# Patient Record
Sex: FEMALE | Race: WHITE | Employment: FULL TIME | URBAN - METROPOLITAN AREA
[De-identification: names, ages, dates, MRNs, and addresses within clinical notes are randomized per-mention and may not be internally consistent; named-entity substitution may affect disease eponyms.]

---

## 2020-02-05 ENCOUNTER — HOSPITAL ENCOUNTER (EMERGENCY)
Age: 45
Discharge: HOME | End: 2020-02-05
Attending: EMERGENCY MEDICINE
Payer: COMMERCIAL

## 2020-02-05 ENCOUNTER — APPOINTMENT (OUTPATIENT)
Dept: CT IMAGING | Age: 45
End: 2020-02-05
Attending: EMERGENCY MEDICINE
Payer: COMMERCIAL

## 2020-02-05 VITALS
RESPIRATION RATE: 17 BRPM | TEMPERATURE: 98.5 F | OXYGEN SATURATION: 100 % | SYSTOLIC BLOOD PRESSURE: 118 MMHG | DIASTOLIC BLOOD PRESSURE: 79 MMHG | WEIGHT: 148.15 LBS | BODY MASS INDEX: 23.2 KG/M2 | HEART RATE: 99 BPM

## 2020-02-05 DIAGNOSIS — N12 PYELONEPHRITIS: Primary | ICD-10-CM

## 2020-02-05 LAB
ALBUMIN SERPL-MCNC: 3.3 G/DL (ref 3.5–5)
ALBUMIN/GLOB SERPL: 0.8 {RATIO} (ref 1.1–2.2)
ALP SERPL-CCNC: 74 U/L (ref 45–117)
ALT SERPL-CCNC: 23 U/L (ref 12–78)
ANION GAP SERPL CALC-SCNC: 13 MMOL/L (ref 5–15)
APPEARANCE UR: ABNORMAL
AST SERPL-CCNC: 14 U/L (ref 15–37)
BACTERIA URNS QL MICRO: ABNORMAL /HPF
BASOPHILS # BLD: 0 K/UL (ref 0–0.1)
BASOPHILS NFR BLD: 0 % (ref 0–1)
BILIRUB SERPL-MCNC: 0.6 MG/DL (ref 0.2–1)
BILIRUB UR QL: NEGATIVE
BUN SERPL-MCNC: 9 MG/DL (ref 6–20)
BUN/CREAT SERPL: 12 (ref 12–20)
CALCIUM SERPL-MCNC: 8.6 MG/DL (ref 8.5–10.1)
CHLORIDE SERPL-SCNC: 97 MMOL/L (ref 97–108)
CO2 SERPL-SCNC: 24 MMOL/L (ref 21–32)
COLOR UR: ABNORMAL
COMMENT, HOLDF: NORMAL
CREAT SERPL-MCNC: 0.73 MG/DL (ref 0.55–1.02)
DIFFERENTIAL METHOD BLD: ABNORMAL
EOSINOPHIL # BLD: 0 K/UL (ref 0–0.4)
EOSINOPHIL NFR BLD: 0 % (ref 0–7)
EPITH CASTS URNS QL MICRO: ABNORMAL /LPF
ERYTHROCYTE [DISTWIDTH] IN BLOOD BY AUTOMATED COUNT: 11.4 % (ref 11.5–14.5)
GLOBULIN SER CALC-MCNC: 4.2 G/DL (ref 2–4)
GLUCOSE SERPL-MCNC: 122 MG/DL (ref 65–100)
GLUCOSE UR STRIP.AUTO-MCNC: NEGATIVE MG/DL
HCT VFR BLD AUTO: 36.9 % (ref 35–47)
HGB BLD-MCNC: 12.5 G/DL (ref 11.5–16)
HGB UR QL STRIP: ABNORMAL
IMM GRANULOCYTES # BLD AUTO: 0.1 K/UL (ref 0–0.04)
IMM GRANULOCYTES NFR BLD AUTO: 1 % (ref 0–0.5)
KETONES UR QL STRIP.AUTO: NEGATIVE MG/DL
LACTATE SERPL-SCNC: 0.9 MMOL/L (ref 0.4–2)
LEUKOCYTE ESTERASE UR QL STRIP.AUTO: ABNORMAL
LYMPHOCYTES # BLD: 1.3 K/UL (ref 0.8–3.5)
LYMPHOCYTES NFR BLD: 13 % (ref 12–49)
MAGNESIUM SERPL-MCNC: 1.9 MG/DL (ref 1.6–2.4)
MCH RBC QN AUTO: 32.5 PG (ref 26–34)
MCHC RBC AUTO-ENTMCNC: 33.9 G/DL (ref 30–36.5)
MCV RBC AUTO: 95.8 FL (ref 80–99)
MONOCYTES # BLD: 0.6 K/UL (ref 0–1)
MONOCYTES NFR BLD: 6 % (ref 5–13)
NEUTS SEG # BLD: 8.4 K/UL (ref 1.8–8)
NEUTS SEG NFR BLD: 80 % (ref 32–75)
NITRITE UR QL STRIP.AUTO: NEGATIVE
NRBC # BLD: 0 K/UL (ref 0–0.01)
NRBC BLD-RTO: 0 PER 100 WBC
PH UR STRIP: 7 [PH] (ref 5–8)
PLATELET # BLD AUTO: 196 K/UL (ref 150–400)
PMV BLD AUTO: 9.1 FL (ref 8.9–12.9)
POTASSIUM SERPL-SCNC: 3.4 MMOL/L (ref 3.5–5.1)
PROT SERPL-MCNC: 7.5 G/DL (ref 6.4–8.2)
PROT UR STRIP-MCNC: NEGATIVE MG/DL
RBC # BLD AUTO: 3.85 M/UL (ref 3.8–5.2)
RBC #/AREA URNS HPF: ABNORMAL /HPF (ref 0–5)
SAMPLES BEING HELD,HOLD: NORMAL
SODIUM SERPL-SCNC: 134 MMOL/L (ref 136–145)
SP GR UR REFRACTOMETRY: 1 (ref 1–1.03)
UROBILINOGEN UR QL STRIP.AUTO: 0.2 EU/DL (ref 0.2–1)
WBC # BLD AUTO: 10.4 K/UL (ref 3.6–11)
WBC URNS QL MICRO: ABNORMAL /HPF (ref 0–4)

## 2020-02-05 PROCEDURE — 80053 COMPREHEN METABOLIC PANEL: CPT

## 2020-02-05 PROCEDURE — 74011250636 HC RX REV CODE- 250/636: Performed by: EMERGENCY MEDICINE

## 2020-02-05 PROCEDURE — 85025 COMPLETE CBC W/AUTO DIFF WBC: CPT

## 2020-02-05 PROCEDURE — 74176 CT ABD & PELVIS W/O CONTRAST: CPT

## 2020-02-05 PROCEDURE — 87077 CULTURE AEROBIC IDENTIFY: CPT

## 2020-02-05 PROCEDURE — 96365 THER/PROPH/DIAG IV INF INIT: CPT

## 2020-02-05 PROCEDURE — 99283 EMERGENCY DEPT VISIT LOW MDM: CPT

## 2020-02-05 PROCEDURE — 87086 URINE CULTURE/COLONY COUNT: CPT

## 2020-02-05 PROCEDURE — 87186 SC STD MICRODIL/AGAR DIL: CPT

## 2020-02-05 PROCEDURE — 87040 BLOOD CULTURE FOR BACTERIA: CPT

## 2020-02-05 PROCEDURE — 83735 ASSAY OF MAGNESIUM: CPT

## 2020-02-05 PROCEDURE — 96375 TX/PRO/DX INJ NEW DRUG ADDON: CPT

## 2020-02-05 PROCEDURE — 81001 URINALYSIS AUTO W/SCOPE: CPT

## 2020-02-05 PROCEDURE — 83605 ASSAY OF LACTIC ACID: CPT

## 2020-02-05 PROCEDURE — 36415 COLL VENOUS BLD VENIPUNCTURE: CPT

## 2020-02-05 PROCEDURE — 74011000258 HC RX REV CODE- 258: Performed by: EMERGENCY MEDICINE

## 2020-02-05 RX ORDER — CIPROFLOXACIN 500 MG/1
500 TABLET ORAL 2 TIMES DAILY
Qty: 14 TAB | Refills: 0 | Status: ON HOLD | OUTPATIENT
Start: 2020-02-05 | End: 2020-02-08 | Stop reason: SDUPTHER

## 2020-02-05 RX ORDER — KETOROLAC TROMETHAMINE 30 MG/ML
30 INJECTION, SOLUTION INTRAMUSCULAR; INTRAVENOUS
Status: COMPLETED | OUTPATIENT
Start: 2020-02-05 | End: 2020-02-05

## 2020-02-05 RX ORDER — KETOROLAC TROMETHAMINE 10 MG/1
10 TABLET, FILM COATED ORAL
Qty: 20 TAB | Refills: 0 | Status: SHIPPED | OUTPATIENT
Start: 2020-02-05 | End: 2022-08-02

## 2020-02-05 RX ADMIN — SODIUM CHLORIDE 1000 ML: 900 INJECTION, SOLUTION INTRAVENOUS at 11:14

## 2020-02-05 RX ADMIN — CEFTRIAXONE 1 G: 1 INJECTION, POWDER, FOR SOLUTION INTRAMUSCULAR; INTRAVENOUS at 11:20

## 2020-02-05 RX ADMIN — KETOROLAC TROMETHAMINE 30 MG: 30 INJECTION, SOLUTION INTRAMUSCULAR at 11:14

## 2020-02-05 NOTE — ED TRIAGE NOTES
TRIAGE NOTE: Arrives for uti symptoms and left flank pain x 2 weeks. Today pain is more consistent and desc as a dull ache. Seen at workplace clinic, they were concerned for pylonephritis. Arrives with results and vitals from clinic.

## 2020-02-05 NOTE — DISCHARGE INSTRUCTIONS
Patient Education        Kidney Infection: Care Instructions  Your Care Instructions    A kidney infection (pyelonephritis) is a type of urinary tract infection, or UTI. Most UTIs are bladder infections. Kidney infections tend to make people much sicker than bladder infections do. A kidney infection is also more serious because it can cause lasting damage if it is not treated quickly. Follow-up care is a key part of your treatment and safety. Be sure to make and go to all appointments, and call your doctor if you are having problems. It's also a good idea to know your test results and keep a list of the medicines you take. How can you care for yourself at home? · Take your antibiotics as directed. Do not stop taking them just because you feel better. You need to take the full course of antibiotics. · Drink plenty of water, enough so that your urine is light yellow or clear like water. This may help wash out bacteria that are causing the infection. If you have kidney, heart, or liver disease and have to limit fluids, talk with your doctor before you increase the amount of fluids you drink. · Urinate often. Try to empty your bladder each time. · To relieve pain, take a hot shower or lay a heating pad (set on low) over your lower belly. Never go to sleep with a heating pad in place. Put a thin cloth between the heating pad and your skin. To help prevent kidney infections  · Drink plenty of water each day. This helps you urinate often, which clears bacteria from your system. If you have kidney, heart, or liver disease and have to limit fluids, talk with your doctor before you increase the amount of fluids you drink. · Urinate when you have the urge. Do not hold your urine for a long time. Urinate before you go to sleep. · If you have symptoms of a bladder infection, such as burning when you urinate or having to urinate often, call your doctor so you can treat the problem before it gets worse.  If you do not treat a bladder infection quickly, it can spread to the kidney. · Men should keep the tip of the penis clean. If you are a woman, keep these ideas in mind:  · Urinate right after you have sex. · Change sanitary pads often. Avoid douches, feminine hygiene sprays, and other feminine hygiene products that have deodorants. · After going to the bathroom, wipe from front to back. When should you call for help? Call your doctor now or seek immediate medical care if:    · You have symptoms that a kidney infection is getting worse. These may include:  ? Pain or burning when you urinate. ? A frequent need to urinate without being able to pass much urine. ? Pain in the flank, which is just below the rib cage and above the waist on either side of the back. ? Blood in the urine. ? A fever.     · You are vomiting or nauseated.    Watch closely for changes in your health, and be sure to contact your doctor if:    · You do not get better as expected. Where can you learn more? Go to http://rosy-joselo.info/. Enter R379 in the search box to learn more about \"Kidney Infection: Care Instructions. \"  Current as of: October 31, 2018  Content Version: 12.2  © 4966-8587 [employer]. Care instructions adapted under license by [employer] (which disclaims liability or warranty for this information). If you have questions about a medical condition or this instruction, always ask your healthcare professional. [place] [address] any warranty or liability for your use of this information.

## 2020-02-05 NOTE — ED PROVIDER NOTES
HPI     Pt is a 45 y.o. F with no PMH here with c/o left flank pain x 1 week. She says pain started as sharp and now has become a dull constant ache. She denies injury. No radiation of pain. No N/V. No hx of kidney stone. She does have hx of UTI and 2 weeks ago had dysuria but she said this subsided spontaneously without treatment. She went to clinic at work today and was found to have fever and be tachycardic with infected urine and thus was sent to ED for concern for pyelonephritis. No other complaints at this time. She was given tylenol at the clinic just PTA. History reviewed. No pertinent past medical history.     Past Surgical History:   Procedure Laterality Date    HX BREAST AUGMENTATION Bilateral 10/9/2014    BILATERAL BREAST AUGMENTATION AND MASTOPEXY, TUMMY TUCK WITH LIPOSUCTION performed by Robb Gibson MD at [facility] MAIN OR    HX OTHER SURGICAL      EXC CYST LEFT LEG         Family History:   Problem Relation Age of Onset    Thyroid Disease Mother     Psychiatric Disorder Mother     Lung Disease Father         LUNG    Cancer Father         PROSTATE    Anesth Problems Neg Hx        Social History     Socioeconomic History    Marital status:      Spouse name: Not on file    Number of children: Not on file    Years of education: Not on file    Highest education level: Not on file   Occupational History    Not on file   Social Needs    Financial resource strain: Not on file    Food insecurity:     Worry: Not on file     Inability: Not on file    Transportation needs:     Medical: Not on file     Non-medical: Not on file   Tobacco Use    Smoking status: Former Smoker     Last attempt to quit: 10/3/2012     Years since quittin.3    Smokeless tobacco: Never Used   Substance and Sexual Activity    Alcohol use: Yes     Comment: 2-3 DRINKS PER WEEK    Drug use: No    Sexual activity: Not on file   Lifestyle    Physical activity:     Days per week: Not on file Minutes per session: Not on file    Stress: Not on file   Relationships    Social connections:     Talks on phone: Not on file     Gets together: Not on file     Attends Confucianist service: Not on file     Active member of club or organization: Not on file     Attends meetings of clubs or organizations: Not on file     Relationship status: Not on file    Intimate partner violence:     Fear of current or ex partner: Not on file     Emotionally abused: Not on file     Physically abused: Not on file     Forced sexual activity: Not on file   Other Topics Concern    Not on file   Social History Narrative    Not on file         ALLERGIES: Patient has no known allergies. Review of Systems   Constitutional: Positive for fever. Negative for chills and diaphoresis. HENT: Negative for congestion and trouble swallowing. Eyes: Negative for photophobia and visual disturbance. Respiratory: Negative for cough, chest tightness and shortness of breath. Cardiovascular: Negative for chest pain, palpitations and leg swelling. Gastrointestinal: Negative for abdominal pain, diarrhea, nausea and vomiting. Genitourinary: Positive for flank pain. Negative for difficulty urinating, dysuria and frequency. Musculoskeletal: Negative for back pain and myalgias. Skin: Negative for rash and wound. Neurological: Negative for dizziness, weakness, light-headedness and headaches. Hematological: Negative for adenopathy. Does not bruise/bleed easily. Psychiatric/Behavioral: Negative for agitation and confusion. All other systems reviewed and are negative. Vitals:    02/05/20 1018   BP: (P) 143/90   Pulse: (!) (P) 115   Resp: (P) 18   Temp: (P) 99.3 °F (37.4 °C)   SpO2: (P) 99%   Weight: 67.2 kg (148 lb 2.4 oz)            Physical Exam  Vitals signs and nursing note reviewed. Constitutional:       General: She is not in acute distress. Appearance: She is well-developed. She is not diaphoretic.    HENT:      Head: Normocephalic. Eyes:      Conjunctiva/sclera: Conjunctivae normal.      Pupils: Pupils are equal, round, and reactive to light. Neck:      Musculoskeletal: Normal range of motion and neck supple. Vascular: No JVD. Cardiovascular:      Rate and Rhythm: Regular rhythm. Tachycardia present. Heart sounds: Normal heart sounds. Pulmonary:      Effort: Pulmonary effort is normal.      Breath sounds: Normal breath sounds. Abdominal:      General: Bowel sounds are normal. There is no distension. Palpations: Abdomen is soft. Tenderness: There is no abdominal tenderness. There is left CVA tenderness. Musculoskeletal: Normal range of motion. General: No tenderness or deformity. Lymphadenopathy:      Cervical: No cervical adenopathy. Skin:     General: Skin is warm and dry. Capillary Refill: Capillary refill takes less than 2 seconds. Findings: No erythema or rash. Neurological:      Mental Status: She is alert and oriented to person, place, and time. Cranial Nerves: No cranial nerve deficit. Sensory: No sensory deficit. MDM       Procedures     Pt with pyelo. No signs of sepsis. Tolerating PO. Renal fxn normal.  No underlying PMH thus will tx outpatient. Rocephin IV given in ED.  cirpo Rx and Cx sent/pending. 12:09 PM  Patient's results have been reviewed with them. Patient and/or family have verbally conveyed their understanding and agreement of the patient's signs, symptoms, diagnosis, treatment and prognosis and additionally agree to follow up as recommended or return to the Emergency Room should their condition change prior to follow-up. Discharge instructions have also been provided to the patient with some educational information regarding their diagnosis as well a list of reasons why they would want to return to the ER prior to their follow-up appointment should their condition change.     Leopold Donna, MD

## 2020-02-06 ENCOUNTER — HOSPITAL ENCOUNTER (INPATIENT)
Age: 45
LOS: 2 days | Discharge: HOME | DRG: 690 | End: 2020-02-08
Attending: EMERGENCY MEDICINE | Admitting: FAMILY MEDICINE
Payer: COMMERCIAL

## 2020-02-06 DIAGNOSIS — N12 PYELONEPHRITIS: ICD-10-CM

## 2020-02-06 DIAGNOSIS — R78.81 BACTEREMIA: Primary | ICD-10-CM

## 2020-02-06 LAB
ALBUMIN SERPL-MCNC: 2.9 G/DL (ref 3.5–5)
ALBUMIN/GLOB SERPL: 0.7 {RATIO} (ref 1.1–2.2)
ALP SERPL-CCNC: 60 U/L (ref 45–117)
ALT SERPL-CCNC: 18 U/L (ref 12–78)
ANION GAP SERPL CALC-SCNC: 5 MMOL/L (ref 5–15)
AST SERPL-CCNC: 14 U/L (ref 15–37)
BASOPHILS # BLD: 0 K/UL (ref 0–0.1)
BASOPHILS NFR BLD: 0 % (ref 0–1)
BILIRUB SERPL-MCNC: 0.3 MG/DL (ref 0.2–1)
BUN SERPL-MCNC: 6 MG/DL (ref 6–20)
BUN/CREAT SERPL: 9 (ref 12–20)
CALCIUM SERPL-MCNC: 8.7 MG/DL (ref 8.5–10.1)
CHLORIDE SERPL-SCNC: 107 MMOL/L (ref 97–108)
CO2 SERPL-SCNC: 25 MMOL/L (ref 21–32)
CREAT SERPL-MCNC: 0.66 MG/DL (ref 0.55–1.02)
DIFFERENTIAL METHOD BLD: ABNORMAL
EOSINOPHIL # BLD: 0.1 K/UL (ref 0–0.4)
EOSINOPHIL NFR BLD: 1 % (ref 0–7)
ERYTHROCYTE [DISTWIDTH] IN BLOOD BY AUTOMATED COUNT: 11.4 % (ref 11.5–14.5)
GLOBULIN SER CALC-MCNC: 4.4 G/DL (ref 2–4)
GLUCOSE SERPL-MCNC: 94 MG/DL (ref 65–100)
HCT VFR BLD AUTO: 38.2 % (ref 35–47)
HGB BLD-MCNC: 12.7 G/DL (ref 11.5–16)
IMM GRANULOCYTES # BLD AUTO: 0.1 K/UL (ref 0–0.04)
IMM GRANULOCYTES NFR BLD AUTO: 1 % (ref 0–0.5)
LACTATE SERPL-SCNC: 1 MMOL/L (ref 0.4–2)
LYMPHOCYTES # BLD: 2.4 K/UL (ref 0.8–3.5)
LYMPHOCYTES NFR BLD: 25 % (ref 12–49)
MCH RBC QN AUTO: 33.2 PG (ref 26–34)
MCHC RBC AUTO-ENTMCNC: 33.2 G/DL (ref 30–36.5)
MCV RBC AUTO: 99.7 FL (ref 80–99)
MONOCYTES # BLD: 1 K/UL (ref 0–1)
MONOCYTES NFR BLD: 11 % (ref 5–13)
NEUTS SEG # BLD: 5.9 K/UL (ref 1.8–8)
NEUTS SEG NFR BLD: 62 % (ref 32–75)
NRBC # BLD: 0 K/UL (ref 0–0.01)
NRBC BLD-RTO: 0 PER 100 WBC
PLATELET # BLD AUTO: 174 K/UL (ref 150–400)
PMV BLD AUTO: 9.5 FL (ref 8.9–12.9)
POTASSIUM SERPL-SCNC: 4.1 MMOL/L (ref 3.5–5.1)
PROT SERPL-MCNC: 7.3 G/DL (ref 6.4–8.2)
RBC # BLD AUTO: 3.83 M/UL (ref 3.8–5.2)
SODIUM SERPL-SCNC: 137 MMOL/L (ref 136–145)
WBC # BLD AUTO: 9.4 K/UL (ref 3.6–11)

## 2020-02-06 PROCEDURE — 36415 COLL VENOUS BLD VENIPUNCTURE: CPT

## 2020-02-06 PROCEDURE — 96365 THER/PROPH/DIAG IV INF INIT: CPT

## 2020-02-06 PROCEDURE — 74011000258 HC RX REV CODE- 258: Performed by: INTERNAL MEDICINE

## 2020-02-06 PROCEDURE — 65660000000 HC RM CCU STEPDOWN

## 2020-02-06 PROCEDURE — 74011250636 HC RX REV CODE- 250/636: Performed by: FAMILY MEDICINE

## 2020-02-06 PROCEDURE — 74011250636 HC RX REV CODE- 250/636: Performed by: EMERGENCY MEDICINE

## 2020-02-06 PROCEDURE — 74011000258 HC RX REV CODE- 258: Performed by: EMERGENCY MEDICINE

## 2020-02-06 PROCEDURE — 99285 EMERGENCY DEPT VISIT HI MDM: CPT

## 2020-02-06 PROCEDURE — 87086 URINE CULTURE/COLONY COUNT: CPT

## 2020-02-06 PROCEDURE — 83605 ASSAY OF LACTIC ACID: CPT

## 2020-02-06 PROCEDURE — 85025 COMPLETE CBC W/AUTO DIFF WBC: CPT

## 2020-02-06 PROCEDURE — 80053 COMPREHEN METABOLIC PANEL: CPT

## 2020-02-06 PROCEDURE — 74011250636 HC RX REV CODE- 250/636: Performed by: INTERNAL MEDICINE

## 2020-02-06 PROCEDURE — 87040 BLOOD CULTURE FOR BACTERIA: CPT

## 2020-02-06 RX ORDER — ENOXAPARIN SODIUM 100 MG/ML
40 INJECTION SUBCUTANEOUS EVERY 24 HOURS
Status: DISCONTINUED | OUTPATIENT
Start: 2020-02-06 | End: 2020-02-07

## 2020-02-06 RX ORDER — SODIUM CHLORIDE 0.9 % (FLUSH) 0.9 %
5-40 SYRINGE (ML) INJECTION EVERY 8 HOURS
Status: DISCONTINUED | OUTPATIENT
Start: 2020-02-06 | End: 2020-02-08 | Stop reason: HOSPADM

## 2020-02-06 RX ORDER — KETOROLAC TROMETHAMINE 30 MG/ML
30 INJECTION, SOLUTION INTRAMUSCULAR; INTRAVENOUS
Status: DISCONTINUED | OUTPATIENT
Start: 2020-02-06 | End: 2020-02-08

## 2020-02-06 RX ORDER — SODIUM CHLORIDE 0.9 % (FLUSH) 0.9 %
5-40 SYRINGE (ML) INJECTION AS NEEDED
Status: DISCONTINUED | OUTPATIENT
Start: 2020-02-06 | End: 2020-02-08 | Stop reason: HOSPADM

## 2020-02-06 RX ADMIN — CEFEPIME HYDROCHLORIDE 2 G: 2 INJECTION, POWDER, FOR SOLUTION INTRAVENOUS at 22:43

## 2020-02-06 RX ADMIN — SODIUM CHLORIDE 1000 ML: 900 INJECTION, SOLUTION INTRAVENOUS at 09:52

## 2020-02-06 RX ADMIN — CEFEPIME HYDROCHLORIDE 2 G: 2 INJECTION, POWDER, FOR SOLUTION INTRAVENOUS at 17:16

## 2020-02-06 RX ADMIN — CEFTRIAXONE 1 G: 1 INJECTION, POWDER, FOR SOLUTION INTRAMUSCULAR; INTRAVENOUS at 11:35

## 2020-02-06 RX ADMIN — SODIUM CHLORIDE 1000 ML: 900 INJECTION, SOLUTION INTRAVENOUS at 11:35

## 2020-02-06 RX ADMIN — ENOXAPARIN SODIUM 40 MG: 40 INJECTION SUBCUTANEOUS at 17:20

## 2020-02-06 RX ADMIN — KETOROLAC TROMETHAMINE 30 MG: 30 INJECTION, SOLUTION INTRAMUSCULAR at 13:57

## 2020-02-06 RX ADMIN — Medication 10 ML: at 22:44

## 2020-02-06 NOTE — PROGRESS NOTES
02/06/20 1348   Vital Signs   Temp 100.2 °F (37.9 °C)   Temp Source Oral   Pulse (Heart Rate) (!) 117   Heart Rate Source Monitor   Resp Rate 16   O2 Sat (%) 100 %   Level of Consciousness Alert   /82   MAP (Calculated) 98   BP 1 Method Automatic   BP 1 Location Right arm   BP Patient Position At rest   MEWS Score 3     MD paged.  No new orders, just to continue with scheduled abx

## 2020-02-06 NOTE — ED NOTES
Pt ambulatory to bathroom. Updated on plan of care. Reports she took tylenol last night for a fever, nothing today. MD aware. No new orders received. Pt tolerating po intake.  Call bell in hand

## 2020-02-06 NOTE — CALL BACK NOTE
The patient was contacted by me and notified of blood culture results and the need to be reevaluated in the ED with possible admission for IV antibiotic for treatment of gram-negative bacteremia.

## 2020-02-06 NOTE — PROGRESS NOTES
Bedside shift change report given to Sharron Miner RN (oncoming nurse) by Sofia Oconnell RN (offgoing nurse). Report included the following information SBAR, Kardex, Intake/Output and MAR.        Primary Nurse Emeka Ma RN and Isabel Bowie RN performed a dual skin assessment on this patient No impairment noted  Candido score is 23

## 2020-02-06 NOTE — H&P
[address] [facility] Adult  Hospitalist Group  History and Physical    Primary Care Provider: Other, MD Darlene  Date of Service:  2/6/2020    Subjective:     Art Zamora is a 45 y.o. female who presents to the ED for evaluation of abnormal labs. Patient has been having left flank pain for past couple of days along with some nausea. She has noticed some dysuria a few days prior to the onset of the left flank pain. Yesterday she went to [place] emergency room with increasing left flank pain and she was found to be febrile. CT of the abdomen and pelvis was obtained showing left perinephric edema. She was diagnosed with pyelonephritis. Blood cultures were obtained and patient was sent home on Cipro. Today patient was called as the blood cultures drawn yesterday showing gram-negative rods in 1 of 4 bottles. He was instructed to come to the ER for further evaluation. In the ER today, she has some tachycardia, but no leukocytosis. Blood cultures were redrawn today and patient has received 1 dose of IV Rocephin in the ER. Hospitalist service requested to admit the patient for management of pyelonephritis with bacteremia. Review of Systems:    A comprehensive review of systems was negative except for that written in the History of Present Illness. Past Medical History:   Diagnosis Date    Pyelonephritis       Past Surgical History:   Procedure Laterality Date    HX BREAST AUGMENTATION Bilateral 10/9/2014    BILATERAL BREAST AUGMENTATION AND MASTOPEXY, TUMMY TUCK WITH LIPOSUCTION performed by Urszula Morton MD at [address] HX OTHER SURGICAL  2005    EXC CYST LEFT LEG     Prior to Admission medications    Medication Sig Start Date End Date Taking? Authorizing Provider   ketorolac (TORADOL) 10 mg tablet Take 1 Tab by mouth every six (6) hours as needed for Pain. 2/5/20  Yes Wendy Martínez MD   ciprofloxacin HCl (CIPRO) 500 mg tablet Take 1 Tab by mouth two (2) times a day for 7 days.  2/5/20 2/12/20 Yes Lorie Goldman MD   clobetasol (TEMOVATE) 0.05 % external solution Apply  to affected area as needed. Yes Provider, Historical   DESOGESTREL-ETHINYL ESTRADIOL (APRI PO) Take 1 tablet by mouth daily. Yes Provider, Historical     No Known Allergies   Family History   Problem Relation Age of Onset    Thyroid Disease Mother     Psychiatric Disorder Mother     Lung Disease Father         LUNG    Cancer Father         PROSTATE    Anesth Problems Neg Hx         SOCIAL HISTORY:  Patient resides at Home. Patient ambulates with no assistance. Smoking history: never  Alcohol history: Occassional        Objective:       Physical Exam:   Visit Vitals  /68 (BP 1 Location: Left arm, BP Patient Position: At rest)   Pulse 100   Temp 99.4 °F (37.4 °C)   Resp 16   LMP 01/28/2020   SpO2 100%     General:  Alert, cooperative, no distress, appears stated age. Head:  Normocephalic, without obvious abnormality, atraumatic. Eyes:  Conjunctivae/corneas clear. PERRL, EOMs intact. Fundi benign. Ears:  Normal TMs and external ear canals both ears. Nose: Nares normal. Septum midline. Mucosa normal. No drainage or sinus tenderness. Throat: Lips, mucosa, and tongue normal. Teeth and gums normal.   Neck: Supple, symmetrical, trachea midline, no adenopathy, thyroid: no enlargement/tenderness/nodules, no carotid bruit and no JVD. Back:   Symmetric, no curvature. ROM normal. No CVA tenderness. Lungs:   Clear to auscultation bilaterally. Chest wall:  No tenderness or deformity. Heart:  Regular rate and rhythm, S1, S2 normal, no murmur, click, rub or gallop. Breast Exam:  Deferred   Abdomen:   Soft, non-tender. Bowel sounds normal. No masses,  No organomegaly. Genitalia:  Deferred   Rectal:  Deferred   Extremities: Extremities normal, atraumatic, no cyanosis or edema. Pulses: 2+ and symmetric all extremities. Skin: Skin color, texture, turgor normal. No rashes or lesions.    Lymph nodes: Cervical, supraclavicular, and axillary nodes normal.   Neurologic: CNII-XII intact. Normal strength, sensation and reflexes throughout. Cap refill: Brisk, less than 3 seconds  Pulses: 2+, symmetric in all extremities    Data Review: All diagnostic labs and studies have been reviewed. CT abdomen and pelvis  No evidence of genitourinary stone or hydronephrosis. Mild left-sided perinephric edema can be seen witha genitourinary infection. Recommend correlation with urinalysis and urinary culture analysis. Incidentally noted is a 2.5 mm left lower lobe pulmonary nodule. Incidentally noted is a 1 cm oval low-density liver lesion. Assessment and Plan:     1. Pyelonephritis  Continue IV Rocephin, preliminary urine culture growing gram-negative rods. Follow final culture. Toradol as needed for pain. 2.  Bacteremia  Likely from pyelonephritis. Blood culture growing gram-negative rods 1 of 4 bottles. Blood cultures repeated today. Infectious disease consult for further recommendation. Continue IV Rocephin. 3.  DVT prophylaxis  Lovenox and SCDs. Estimated length of stay is 2 midnights. FUNCTIONAL STATUS PRIOR TO HOSPITALIZATION (including history of recent falls):  Independent    Signed By: Syeda Rolon MD     February 6, 2020

## 2020-02-06 NOTE — ED PROVIDER NOTES
45 y.o. female with no significant past medical history who presents from home with chief complaint of abnormal lab results. Pt presents to the ED after being called this morning and told she had a positive blood culture result. Pt states she was seen at [facility] yesterday and diagnosed with pyelonephritis. Pt was prescribed Cipro and Toradol. Pt states she went to the ED yesterday due to left sided flank pain, nausea, and decreased PO intake for the past week. Pt notes she had a fever of 101 F last night. Pt states she currently has a headache, but feels generally better than yesterday. Pt denies vomiting. There are no other acute medical concerns at this time. Social hx: Former Smoker. EtOH Use. PCP: Other, MD Darlene    Note written by Alannah Dixon. Tigre Wilkerson, as dictated by Mei Lazo MD 9:08 AM      The history is provided by the patient and medical records.         Past Medical History:   Diagnosis Date    Pyelonephritis        Past Surgical History:   Procedure Laterality Date    HX BREAST AUGMENTATION Bilateral 10/9/2014    BILATERAL BREAST AUGMENTATION AND MASTOPEXY, TUMMY TUCK WITH LIPOSUCTION performed by Pam [facility] MD at [facility]    HX OTHER SURGICAL  2005    EXC CYST LEFT LEG         Family History:   Problem Relation Age of Onset    Thyroid Disease Mother     Psychiatric Disorder Mother     Lung Disease Father         LUNG    Cancer Father         PROSTATE    Anesth Problems Neg Hx        Social History     Socioeconomic History    Marital status:      Spouse name: Not on file    Number of children: Not on file    Years of education: Not on file    Highest education level: Not on file   Occupational History    Not on file   Social Needs    Financial resource strain: Not on file    Food insecurity:     Worry: Not on file     Inability: Not on file    Transportation needs:     Medical: Not on file     Non-medical: Not on file   Tobacco Use    Smoking status: Former Smoker     Last attempt to quit: 10/3/2012     Years since quittin.3    Smokeless tobacco: Never Used   Substance and Sexual Activity    Alcohol use: Yes     Comment: 2-3 DRINKS PER WEEK    Drug use: No    Sexual activity: Not on file   Lifestyle    Physical activity:     Days per week: Not on file     Minutes per session: Not on file    Stress: Not on file   Relationships    Social connections:     Talks on phone: Not on file     Gets together: Not on file     Attends Faith service: Not on file     Active member of club or organization: Not on file     Attends meetings of clubs or organizations: Not on file     Relationship status: Not on file    Intimate partner violence:     Fear of current or ex partner: Not on file     Emotionally abused: Not on file     Physically abused: Not on file     Forced sexual activity: Not on file   Other Topics Concern    Not on file   Social History Narrative    Not on file         ALLERGIES: Patient has no known allergies. Review of Systems   Constitutional: Positive for fever. Negative for chills. HENT: Negative for rhinorrhea and sore throat. Respiratory: Negative for cough and shortness of breath. Cardiovascular: Negative for chest pain. Gastrointestinal: Positive for nausea. Negative for abdominal pain, diarrhea and vomiting. Genitourinary: Positive for flank pain. Negative for dysuria and urgency. Musculoskeletal: Negative for arthralgias and back pain. Skin: Negative for rash. Neurological: Positive for headaches. Negative for dizziness, weakness and light-headedness. All other systems reviewed and are negative. Vitals:    20 0849   BP: 128/89   Pulse: (!) 114   Resp: 16   Temp: 98.8 °F (37.1 °C)   SpO2: 100%            Physical Exam   Vital signs reviewed. Nursing notes reviewed.     Const:  No acute distress, well developed, well nourished  Head:  Atraumatic, normocephalic  Eyes:  PERRL, conjunctiva normal, no scleral icterus  Neck:  Supple, trachea midline  Cardiovascular:  RR, no murmurs, no gallops, no rubs. Tachycardic. Resp:  No resp distress, no increased work of breathing, no wheezes, no rhonchi, no rales,  Abd:  Soft, non-tender, non-distended, no rebound, no guarding, no CVA tenderness  MSK:  No pedal edema, normal ROM  Neuro:  Alert and oriented x3, no cranial nerve defect  Skin:  Warm, dry, intact  Psych: normal mood and affect, behavior is normal, judgement and thought content is normal    Note written by Willow Beck, as dictated by Kwan Brito MD 9:10 AM    MDM  Number of Diagnoses or Management Options  Bacteremia:   Pyelonephritis:      Amount and/or Complexity of Data Reviewed  Clinical lab tests: ordered and reviewed  Tests in the radiology section of CPT®: ordered and reviewed  Review and summarize past medical records: yes    Critical Care  Total time providing critical care: 30-74 minutes (35 min.)    Patient Progress  Patient progress: stable         Procedures        Hospitalist Perfect Serve for Admission  12:13 PM    ED Room Number: ER09/09  Patient Name and age:  Bhargavi Dawson 45 y.o.  female  Working Diagnosis:   1. Bacteremia    2. Pyelonephritis      Readmission: no  Isolation Requirements:  no  Recommended Level of Care:  med/surg  Code Status:  Full Code  Department:[facility] Adult ED - [phone]        Mrs. Wesley Alonso is a 57-year-old female who presents to the ER with known bacteremia. She was diagnosed yesterday with pyelonephritis and had a blood culture that showed gram-negative rods. She developed fevers of 101 yesterday. She is persistently tachycardic in the ER despite IV fluid. Her heart rate after 1.5 L was 115. I have given her another dose of antibiotics.   She is to be evaluated for admission by the hospitalist.

## 2020-02-06 NOTE — ED NOTES
TRANSFER - OUT REPORT:    Verbal report given to [facility] DAWN on Ayesha Gardner  being transferred to [facility] [place] for routine progression of care       Report consisted of patients Situation, Background, Assessment and   Recommendations(SBAR). Information from the following report(s) SBAR, Kardex, ED Summary, [facility] and Recent Results was reviewed with the receiving nurse. Lines:       Opportunity for questions and clarification was provided.

## 2020-02-06 NOTE — PROGRESS NOTES
ID consult received     GNR bacteremia  On Ceftriaxone  Change to Cefepime pending cultures   Perinephric edema on CT .  Drain any possible collections if present     Full consult and patient to be seen 2/7/20    Mariola Lacy DO  2:22 PM

## 2020-02-06 NOTE — PROGRESS NOTES
Admission Medication Reconciliation:    Information obtained from:  Patient  RxQuery data available¹:  YES    Comments/Recommendations: Spoke with patient regarding use of PTA medications including prescription/OTC, vitamins/supplements, inhaled, topical, nasal, injectable, otic and ophthalmic medications. Patient was good historian. Updated PTA meds/reviewed patient's allergies. 1)  Of note, patient just prescribed ciprofloxacin 500 mg BID x 7 days yesterday for UTI/pyelonephritis. Patient states she has taken two doses so far, with her most recent dose this morning. 2)  Medication changes (since last review): Added  - Hair, skin, nails multivitamin    Adjusted  - None    Removed  - Clobetasol 0.05% soln     ¹RxQuery pharmacy benefit data reflects medications filled and processed through the patient's insurance, however   this data does NOT capture whether the medication was picked up or is currently being taken by the patient. Allergies:  Patient has no known allergies. Significant PMH/Disease States:   Past Medical History:   Diagnosis Date    Pyelonephritis      Chief Complaint for this Admission:    Chief Complaint   Patient presents with    Abnormal Lab Results     Prior to Admission Medications:   Prior to Admission Medications   Prescriptions Last Dose Informant Taking? DESOGESTREL-ETHINYL ESTRADIOL (APRI PO) 2/6/2020 at AM  Yes   Sig: Take 1 tablet by mouth daily. ciprofloxacin HCl (CIPRO) 500 mg tablet 2/6/2020 at AM  Yes   Sig: Take 1 Tab by mouth two (2) times a day for 7 days. ketorolac (TORADOL) 10 mg tablet 2/5/2020 at PM  Yes   Sig: Take 1 Tab by mouth every six (6) hours as needed for Pain.   multivitamin-folic acid-biotin (HAIR-SKIN-NAILS, MV-FA-BIOTIN,) 400-2,000 mcg tab   Yes   Sig: Take 1 Tab by mouth daily.       Facility-Administered Medications: None       Please contact the main inpatient pharmacy with any questions or concerns at [phone] and we will direct you to the clinical pharmacist covering this patient's care while in-house.    ESTER Rodriguez

## 2020-02-06 NOTE — ED NOTES
Assumed care of patient. Pt resting in position of comfort. Call bell within reach. Pt reports left sided flank pain, was seen at [place] ED, lab work obtained. Reports called by MD today and told to come back to the ED for a positive blood culture.  Reports feeling \"okay\"

## 2020-02-07 LAB
BACTERIA SPEC CULT: ABNORMAL
BACTERIA SPEC CULT: NORMAL
CC UR VC: ABNORMAL
SERVICE CMNT-IMP: ABNORMAL
SERVICE CMNT-IMP: NORMAL

## 2020-02-07 PROCEDURE — 65270000032 HC RM SEMIPRIVATE

## 2020-02-07 PROCEDURE — 74011000258 HC RX REV CODE- 258: Performed by: INTERNAL MEDICINE

## 2020-02-07 PROCEDURE — 74011250636 HC RX REV CODE- 250/636: Performed by: INTERNAL MEDICINE

## 2020-02-07 RX ADMIN — CEFEPIME HYDROCHLORIDE 2 G: 2 INJECTION, POWDER, FOR SOLUTION INTRAVENOUS at 06:25

## 2020-02-07 RX ADMIN — CEFEPIME HYDROCHLORIDE 2 G: 2 INJECTION, POWDER, FOR SOLUTION INTRAVENOUS at 22:11

## 2020-02-07 RX ADMIN — Medication 10 ML: at 15:33

## 2020-02-07 RX ADMIN — Medication 10 ML: at 22:12

## 2020-02-07 RX ADMIN — Medication 10 ML: at 06:25

## 2020-02-07 RX ADMIN — CEFEPIME HYDROCHLORIDE 2 G: 2 INJECTION, POWDER, FOR SOLUTION INTRAVENOUS at 15:32

## 2020-02-07 NOTE — PROGRESS NOTES
Hospitalist Progress Note  Marbin Samuels MD  Answering service: [phone] from in house phone        Date of Service:  2020  NAME:  Mary Doan  :  1975  MRN:  [id number]  PCP: Sav Ferguson MD    Chief Complaint:   Chief Complaint   Patient presents with    Abnormal Lab Results         Admission Summary:     Mary Doan is a 45 y.o. female who presented with bacteremia. Interval history / Subjective:   Patient seen for Follow up of CC: pyelonephritis  Patient seen and examined by the bedside, Labs, images and notes reviewed  Afebrile this am, left sided flank pain is much improved, no cough, no headache.  comfortable, Denies any chest pain, abdominal pain, fevers, chills. No N/V/D  Discussed with nursing staff, no acute issues overnight, orders reviewed. Assessment & Plan:     1. Pyelonephritis, left: POA  preliminary urine culture growing gram-negative rods. Follow final culture. Toradol as needed for pain. ID notes reviewed, ABX changed to Cefepime, tolerating it well. CT abd reviewed, shows Pyelonephritis     2. GNR Bacteremia sec to Pyelonephritis, POA  Blood culture growing gram-negative rods 1 of 4 bottles. Blood cultures repeated 20: NGTD  Management as above    Code status: Full Code    DVT prophylaxis: none. Patient is ambulatory and is requesting to stop Lovenox    Care Plan discussed with: Patient/Family and Nurse    Disposition: TBD    Hospital Problems  Never Reviewed          Codes Class Noted POA    Bacteremia ICD-10-CM: R78.81  ICD-9-CM: 790.7  2020 Unknown                Review of Systems:   A comprehensive review of systems was negative.          Physical Examination:     General appearance: alert, cooperative, no distress, appears stated age  Head: Normocephalic, without obvious abnormality, atraumatic  Eyes: negative findings: anicteric sclera  Throat: Lips, mucosa, and tongue normal. Teeth and gums normal  Back: positive left CVA tenderness  Lungs: clear to auscultation bilaterally  Heart: regular rate and rhythm  Abdomen: soft, non-tender. Bowel sounds normal. No masses,  no organomegaly  Extremities: extremities normal, atraumatic, no cyanosis or edema  Neurologic: Grossly normal       Vital Signs:    Last 24hrs VS reviewed since prior progress note. Most recent are:    Visit Vitals  /77 (BP 1 Location: Left arm, BP Patient Position: At rest)   Pulse (!) 103   Temp 98.7 °F (37.1 °C)   Resp 16   Ht 5' 7\" (1.702 m)   Wt 66.5 kg (146 lb 9.7 oz)   SpO2 98%   BMI 22.96 kg/m²         Intake/Output Summary (Last 24 hours) at 2020 1105  Last data filed at 2020 1800  Gross per 24 hour   Intake 240 ml   Output --   Net 240 ml        Tmax:  Temp (24hrs), Av.5 °F (37.5 °C), Min:98.7 °F (37.1 °C), Max:100.2 °F (37.9 °C)      Data Review:   Data reviewed by myself:  Ct Abd Pelv Wo Cont    Result Date: 2020  EXAM: CT ABD PELV WO CONT INDICATION: left flank pain x 1 week with fever COMPARISON: None CONTRAST:  None. TECHNIQUE: Thin axial images were obtained through the abdomen and pelvis. Coronal and sagittal reconstructions were generated. Oral contrast was not administered. CT dose reduction was achieved through use of a standardized protocol tailored for this examination and automatic exposure control for dose modulation. The absence of intravenous contrast material reduces the sensitivity for evaluation of the solid parenchymal organs of the abdomen. FINDINGS: LUNG BASES: There is a left lower lobe pulmonary nodule measuring 2.5 mm on image 4-4. The lung bases are otherwise clear. INCIDENTALLY IMAGED HEART AND MEDIASTINUM: Breast implants are partially visualized. LIVER: There is a single low-density oval lesion in the right lobe of the liver measuring 1 cm, image 2-19 and coronal image 51, indeterminate due to its small size and absence of contrast material. The liver is otherwise normal. GALLBLADDER: Unremarkable.  SPLEEN: No mass. PANCREAS: No mass or ductal dilatation. ADRENALS: Unremarkable. KIDNEYS/URETERS: No mass, calculus, or hydronephrosis. There is left-sided perinephric inflammation/edema. No focal fluid collections are identified. STOMACH: Unremarkable. SMALL BOWEL: No bowel obstruction or perforation COLON: No bowel obstruction or perforation APPENDIX: Unremarkable. PERITONEUM: No ascites or pneumoperitoneum. RETROPERITONEUM: No lymphadenopathy or aortic aneurysm. REPRODUCTIVE ORGANS: The uterus is noted. There is no significant pelvic free fluid. URINARY BLADDER: No mass or calculus. BONES: No destructive bone lesion. ADDITIONAL COMMENTS: N/A     IMPRESSION: 1. No evidence of genitourinary stone or hydronephrosis. 2. Mild left-sided perinephric edema can be seen witha genitourinary infection. Recommend correlation with urinalysis and urinary culture analysis. 3. Incidentally noted is a 2.5 mm left lower lobe pulmonary nodule. 4. Incidentally noted is a 1 cm oval low-density liver lesion. Guidelines by the Fleischner society (Radiology 2005: 375:339-267) suggest that patients with a low risk for lung cancer who have nodules less than or equal to 4 mm in diameter require no follow-up. In patients with a  higher risk, such as smokers, follow-up is recommended in one year. Patients with a known malignancy are at increased risk for metastasis and should receive three month follow-up.     No results found for: SDES  Lab Results   Component Value Date/Time    Culture result: NO GROWTH AFTER 19 HOURS 02/06/2020 09:48 AM    Culture result: No growth (<1,000 CFU/ML) 02/06/2020 09:48 AM    Culture result: (A) 02/05/2020 12:00 PM     APPARENT GRAM NEGATIVE RODS GROWING IN 1 OF 4 BOTTLES DRAWN (SITE = [facility])    Culture result: (A) 02/05/2020 12:00 PM     PRELIMINARY REPORT OF APPARENT GRAM NEGATIVE RODS GROWING IN 1 OF 4 BOTTLES DRAWN CALLED TO AND READ BACK BY DR. Ron TORREZ 0600 02/06/20 HR    Culture result: REMAINING BOTTLE(S) HAS/HAVE NO GROWTH SO FAR 02/05/2020 12:00 PM     All Micro Results     Procedure Component Value Units Date/Time    CULTURE, URINE [[id number]] Collected:  02/06/20 0948    Order Status:  Completed Specimen:  Urine from Clean catch Updated:  02/07/20 0922     Special Requests: NO SPECIAL REQUESTS        Culture result: No growth (<1,000 CFU/ML)       CULTURE, BLOOD, PAIRED [[id number]] Collected:  02/06/20 0948    Order Status:  Completed Specimen:  Blood Updated:  02/07/20 0541     Special Requests: NO SPECIAL REQUESTS        Culture result: NO GROWTH AFTER 19 HOURS             Labs: reviewed by myself. Recent Labs     02/06/20 0948 02/05/20  1034   WBC 9.4 10.4   HGB 12.7 12.5   HCT 38.2 36.9    196     Recent Labs     02/06/20 0948 02/05/20  1034    134*   K 4.1 3.4*    97   CO2 25 24   BUN 6 9   CREA 0.66 0.73   GLU 94 122*   CA 8.7 8.6   MG  --  1.9     Recent Labs     02/06/20 0948 02/05/20  1034   SGOT 14* 14*   ALT 18 23   AP 60 74   TBILI 0.3 0.6   TP 7.3 7.5   ALB 2.9* 3.3*   GLOB 4.4* 4.2*     No results for input(s): INR, PTP, APTT, INREXT in the last 72 hours. No results for input(s): FE, TIBC, PSAT, FERR in the last 72 hours. No results found for: FOL, RBCF   No results for input(s): PH, PCO2, PO2 in the last 72 hours. No results for input(s): CPK, CKNDX, TROIQ in the last 72 hours.     No lab exists for component: CPKMB  No results found for: CHOL, CHOLX, CHLST, CHOLV, HDL, HDLP, LDL, LDLC, DLDLP, TGLX, TRIGL, TRIGP, CHHD, CHHDX  No results found for: [facility]  Lab Results   Component Value Date/Time    Color YELLOW/STRAW 02/05/2020 10:34 AM    Appearance HAZY (A) 02/05/2020 10:34 AM    Specific gravity 1.005 02/05/2020 10:34 AM    pH (UA) 7.0 02/05/2020 10:34 AM    Protein NEGATIVE  02/05/2020 10:34 AM    Glucose NEGATIVE  02/05/2020 10:34 AM    Ketone NEGATIVE  02/05/2020 10:34 AM    Bilirubin NEGATIVE  02/05/2020 10:34 AM    Urobilinogen 0.2 02/05/2020 10:34 AM Nitrites NEGATIVE  02/05/2020 10:34 AM    Leukocyte Esterase LARGE (A) 02/05/2020 10:34 AM    Epithelial cells MODERATE (A) 02/05/2020 10:34 AM    Bacteria 4+ (A) 02/05/2020 10:34 AM    WBC  02/05/2020 10:34 AM    RBC 5-10 02/05/2020 10:34 AM         Medications Reviewed:     Current Facility-Administered Medications   Medication Dose Route Frequency    . PHARMACY TO SUBSTITUTE PER PROTOCOL (Reordered from: DESOGESTREL-ETHINYL ESTRADIOL (APRI PO))    Per Protocol    sodium chloride (NS) flush 5-40 mL  5-40 mL IntraVENous Q8H    sodium chloride (NS) flush 5-40 mL  5-40 mL IntraVENous PRN    ketorolac (TORADOL) injection 30 mg  30 mg IntraVENous Q6H PRN    cefepime (MAXIPIME) 2 g in 0.9% sodium chloride (MBP/ADV) 100 mL  2 g IntraVENous Q8H     ______________________________________________________________________  EXPECTED LENGTH OF STAY: - - -  ACTUAL LENGTH OF STAY:          1                 Karlos Velez MD     Patient's emergency contacts:  Extended Emergency Contact Information  Primary Emergency Contact: Lg Mcdaniel  Address: [address] Phone: [phone]  Mobile Phone: [phone]  Relation: Spouse

## 2020-02-07 NOTE — PROGRESS NOTES
Problem: Falls - Risk of  Goal: *Absence of Falls  Description  Document Sophia Geno Fall Risk and appropriate interventions in the flowsheet.   Outcome: Progressing Towards Goal  Note: Fall Risk Interventions:            Medication Interventions: Teach patient to arise slowly                   Problem: Patient Education: Go to Patient Education Activity  Goal: Patient/Family Education  Outcome: Progressing Towards Goal

## 2020-02-07 NOTE — PROGRESS NOTES
Infectious Diseases Consultation     Please see dictated note     Impression      1. Left pyelonephritis with GN bacteremia    Recommend:      1.  Continue Cefepime pending sensitivity data       Thanks,   Amilcar Quick MD  2/6/2020  10:19 PM

## 2020-02-07 NOTE — CONSULTS
[address]    Name:  Loc Peña  MR#:  [id number]  :  1975  ACCOUNT #:  [de-identified]  DATE OF SERVICE:  2020    LOCATION:  The patient is currently in room 210. ATTENDING:  Syeda Rolon MD    The consult was requested by Dr. Garrett Jansen. CHIEF COMPLAINT:  Left flank pain and fever. REASON FOR CONSULTATION:  Positive blood cultures. The consultation was requested by Dr. Garrett Jansen. The history is obtained by interviewing the patient, review of the medical records. HISTORY OF PRESENT ILLNESS:  This patient is a 51-year-old white female with no known chronic medical problems. The patient dates the onset of her current illness to , 2020. She noted some dysuria and urinary frequency. However, the symptoms resolved by the next day. She got well until 2020. On that day, she developed sharp pain in her left low back or flank. This pain persisted. On 2020, she went to the [facility]. It was felt that she had a urinary tract infection. She was sent to the [facility] at about 10:00 a.m. By her description, she received a dose of Rocephin and was sent home on Cipro. She started Cipro that night and that night she had a temperature up to 101.4 degrees Fahrenheit, that is the first time that she had taken her temperature. Then early this morning at about 07:00 a.m. the hospital called the patient to tell her that blood cultures drawn on 2020 had turned positive. She came back to the hospital and was admitted. She has been started on cefepime, and we are now asked to her for further evaluation. The patient denies any known history of nephrolithiasis. She does not recall being admitted to the hospital for UTI in the past.    PAST MEDICAL HISTORY:  Tobacco, she used to smoke up to a pack per day but quit about 20 years ago. Alcohol, social use. MEDICATIONS PRIOR TO ADMISSION  1.   Birth control pills. 2.  Hair, skin, and nail vitamin supplement-one daily. ALLERGIES:  NONE KNOWN. ILLNESSES:  None. SURGERY:  Bilateral breast implants. SOCIAL HISTORY:  The patient lives here in [facility] [place] with her . FAMILY HISTORY:  Unremarkable. REVIEW OF SYSTEMS:  CONSTITUTIONAL:  She has had some fever on the day of admission. HEENT:  No headache or visual change and no sore throat. LYMPHATIC:  No history of adenopathy. DERMATOLOGIC:  No recent rashes. RESPIRATORY:  No cough. CARDIOVASCULAR:  No chest pain. GI:  No nausea, vomiting, diarrhea. :  As in HPI. MUSCULOSKELETAL:  No myalgias or arthralgias. NEUROLOGIC:  No history of seizure or stroke. PHYSICAL EXAMINATION:  GENERAL:  In no acute distress. VITAL SIGNS:  Blood pressure is 130/82, heart rate 117, respiratory rate 16, T-max 100.2 degrees Fahrenheit. HEENT:  Sclerae and conjunctivae benign, oropharynx unremarkable. NECK:  Supple. NODES:  No adenopathy. SKIN:  No rashes. LUNGS:  Clear. CARDIOVASCULAR:  Regular rate and rhythm without murmurs. ABDOMEN:  Soft, nontender, no masses, bowel sounds are present. She may be minimally tender over the left kidney. No CVA tenderness. PELVIC:  Exam not done. EXTREMITIES:  No cellulitis. NEUROLOGIC:  Alert and oriented. MUSCULOSKELETAL:  Muscles nontender and joints benign. LABORATORY DATA:  CBC, which they went to the [facility], revealed a hemoglobin of 12.5 and white count 10,400 with 80% neutrophils and 13% lymphocytes and a normal platelet count. Today, the white count is 9400 and the differential shows 62% neutrophils and 25% lymphocytes. Chemistry data reveals a BUN of 6 and creatinine 0.66 with essentially normal liver function tests. Urinalysis at the [facility]  had  white cells, 5-10 red cells, and 4+ bacteria.     MICROBIOLOGY DATA:  Urine culture on 02/05/2020 reveals greater than 1000 colonies of gram-negative geno that has not yet been identified. In addition, blood cultures drawn on that day have grown gram-negative rods in one out of four bottles. RADIOLOGY DATA:  CT of the abdomen and pelvis shows no evidence of hydronephrosis, but there is inflammatory stranding around the left kidney. IMPRESSION:  Left pyelonephritis and Gram-negative bacteremia-I think this is the most likely diagnosis. I will leave her on cefepime pending culture results. Hopefully, she will be able to be treated as an outpatient soon. RECOMMENDATIONS:  Continue cefepime pending sensitivity data. Thank you very much for allowing us to see this patient with you.     MD LETICIA [email or web address]  D:  02/07/2020 0:48  T:  02/07/2020 2:23  JOB #:  [id number]  CC:  MD Aries Hunt MD

## 2020-02-08 VITALS
HEART RATE: 100 BPM | BODY MASS INDEX: 22.91 KG/M2 | WEIGHT: 145.94 LBS | TEMPERATURE: 98.5 F | SYSTOLIC BLOOD PRESSURE: 130 MMHG | HEIGHT: 67 IN | DIASTOLIC BLOOD PRESSURE: 89 MMHG | RESPIRATION RATE: 16 BRPM | OXYGEN SATURATION: 100 %

## 2020-02-08 LAB
ANION GAP SERPL CALC-SCNC: 6 MMOL/L (ref 5–15)
BASOPHILS # BLD: 0 K/UL (ref 0–0.1)
BASOPHILS NFR BLD: 1 % (ref 0–1)
BUN SERPL-MCNC: 9 MG/DL (ref 6–20)
BUN/CREAT SERPL: 14 (ref 12–20)
CALCIUM SERPL-MCNC: 8.5 MG/DL (ref 8.5–10.1)
CHLORIDE SERPL-SCNC: 110 MMOL/L (ref 97–108)
CO2 SERPL-SCNC: 23 MMOL/L (ref 21–32)
CREAT SERPL-MCNC: 0.63 MG/DL (ref 0.55–1.02)
DIFFERENTIAL METHOD BLD: ABNORMAL
EOSINOPHIL # BLD: 0.2 K/UL (ref 0–0.4)
EOSINOPHIL NFR BLD: 3 % (ref 0–7)
ERYTHROCYTE [DISTWIDTH] IN BLOOD BY AUTOMATED COUNT: 11.2 % (ref 11.5–14.5)
GLUCOSE SERPL-MCNC: 97 MG/DL (ref 65–100)
HCT VFR BLD AUTO: 35.8 % (ref 35–47)
HGB BLD-MCNC: 11.8 G/DL (ref 11.5–16)
IMM GRANULOCYTES # BLD AUTO: 0 K/UL (ref 0–0.04)
IMM GRANULOCYTES NFR BLD AUTO: 1 % (ref 0–0.5)
LYMPHOCYTES # BLD: 1.9 K/UL (ref 0.8–3.5)
LYMPHOCYTES NFR BLD: 31 % (ref 12–49)
MCH RBC QN AUTO: 32.7 PG (ref 26–34)
MCHC RBC AUTO-ENTMCNC: 33 G/DL (ref 30–36.5)
MCV RBC AUTO: 99.2 FL (ref 80–99)
MONOCYTES # BLD: 0.6 K/UL (ref 0–1)
MONOCYTES NFR BLD: 9 % (ref 5–13)
NEUTS SEG # BLD: 3.5 K/UL (ref 1.8–8)
NEUTS SEG NFR BLD: 55 % (ref 32–75)
NRBC # BLD: 0 K/UL (ref 0–0.01)
NRBC BLD-RTO: 0 PER 100 WBC
PLATELET # BLD AUTO: 195 K/UL (ref 150–400)
PMV BLD AUTO: 9.3 FL (ref 8.9–12.9)
POTASSIUM SERPL-SCNC: 3.9 MMOL/L (ref 3.5–5.1)
RBC # BLD AUTO: 3.61 M/UL (ref 3.8–5.2)
SODIUM SERPL-SCNC: 139 MMOL/L (ref 136–145)
WBC # BLD AUTO: 6.2 K/UL (ref 3.6–11)

## 2020-02-08 PROCEDURE — 74011250636 HC RX REV CODE- 250/636: Performed by: INTERNAL MEDICINE

## 2020-02-08 PROCEDURE — 36415 COLL VENOUS BLD VENIPUNCTURE: CPT

## 2020-02-08 PROCEDURE — 85025 COMPLETE CBC W/AUTO DIFF WBC: CPT

## 2020-02-08 PROCEDURE — 74011000258 HC RX REV CODE- 258: Performed by: INTERNAL MEDICINE

## 2020-02-08 PROCEDURE — 80048 BASIC METABOLIC PNL TOTAL CA: CPT

## 2020-02-08 RX ORDER — CIPROFLOXACIN 500 MG/1
500 TABLET ORAL 2 TIMES DAILY
Qty: 22 TAB | Refills: 0 | Status: SHIPPED | OUTPATIENT
Start: 2020-02-08 | End: 2020-02-08 | Stop reason: SDUPTHER

## 2020-02-08 RX ORDER — CIPROFLOXACIN 500 MG/1
500 TABLET ORAL 2 TIMES DAILY
Qty: 12 TAB | Refills: 0 | Status: SHIPPED | OUTPATIENT
Start: 2020-02-08 | End: 2022-08-02

## 2020-02-08 RX ADMIN — Medication 10 ML: at 05:10

## 2020-02-08 RX ADMIN — CEFTRIAXONE SODIUM 2 G: 2 INJECTION, POWDER, FOR SOLUTION INTRAMUSCULAR; INTRAVENOUS at 08:29

## 2020-02-08 NOTE — PROGRESS NOTES
Bedside shift change report given to Mishel Chino4 Student Nurse (oncoming nurse) by Christiano Post (offgoing nurse). Report included the following information SBAR, Kardex, Procedure Summary and MAR.

## 2020-02-08 NOTE — DISCHARGE INSTRUCTIONS
Discharge Instructions       PATIENT ID: Sonal Ken  MRN: [id number]   YOB: 1975    DATE OF ADMISSION: 2/6/2020  8:56 AM    DATE OF DISCHARGE: 2/8/2020    PRIMARY CARE PROVIDER: Darlene Myers MD     ATTENDING PHYSICIAN: Trinity Patel MD  DISCHARGING PROVIDER: Karlos Velez MD    To contact this individual call [phone] and ask the  to page. If unavailable ask to be transferred the Adult Hospitalist Department. DISCHARGE DIAGNOSES     E. Coli Bacteremia due to Pyelonephritis  Pan sensitive, as per ID recs, will DC home on Ciprofloxacin 500 mg po BID till 2/19/2020. Add Probiotics on DC as well. CONSULTATIONS: IP CONSULT TO INFECTIOUS DISEASES    PROCEDURES/SURGERIES: * No surgery found *    PENDING TEST RESULTS:   At the time of discharge the following test results are still pending: BC x 2 No growth so far, final pending    FOLLOW UP APPOINTMENTS:   Follow-up Information     Follow up With Specialties Details Why Contact Info    Belén Grant MD Infectious Diseases Schedule an appointment as soon as possible for a visit as recommended [address] [place] [address] [id number] [facility],[address] [address] [phone]      Follow up with PCP in a week               ADDITIONAL CARE RECOMMENDATIONS:   · It is important that you take the medication exactly as they are prescribed. · Keep your medication in the bottles provided by the pharmacist and keep a list of the medication names, dosages, and times to be taken in your wallet. · Do not take other medications without consulting your doctor. · No drinking alcohol or driving car or operating machinery if you are on narcotic pain medications. Donot take sedating mediations if you are sleepy or confused.    · Fall Precautions  · Keep Well Hydrated  · Report to your medical provider if you feel you have  developed allergies to medications  · Follow up with your PCP or Consultant for medication adjustments and refills  · Monitor for signs of fevers,chills,bleeding,chest pain and seek medical attention if you do so. DIET: Regular Diet    ACTIVITY: Ambulate in house    WOUND CARE: n/a    EQUIPMENT needed: n/a      DISCHARGE MEDICATIONS:   See Medication Reconciliation Form    · It is important that you take the medication exactly as they are prescribed. · Keep your medication in the bottles provided by the pharmacist and keep a list of the medication names, dosages, and times to be taken in your wallet. · Do not take other medications without consulting your doctor. NOTIFY YOUR PHYSICIAN FOR ANY OF THE FOLLOWING:   Fever over 101 degrees for 24 hours. Chest pain, shortness of breath, fever, chills, nausea, vomiting, diarrhea, change in mentation, falling, weakness, bleeding. Severe pain or pain not relieved by medications. Or, any other signs or symptoms that you may have questions about. DISPOSITION:  x  Home With:   OT  PT  HH  RN       SNF/Inpatient Rehab/LTAC    Independent/assisted living    Hospice    Other:         Information obtained by :   I understand that if any problems occur once I am at home I am to contact my physician. I understand and acknowledge receipt of the instructions indicated above.                                                                                                                                              [de-identified] or R.N.'s Signature                                                                  Date/Time                                                                                                                                              Patient or Representative Signature                                                          Date/Time          Signed:   Juan Pablo Erickson MD  2/8/2020  7:49 AM

## 2020-02-08 NOTE — ROUTINE PROCESS
Bedside shift change report given to Hilton Munroe (oncoming nurse) by Meek Narvaez (offgoing nurse). Report included the following information SBAR, Kardex, Intake/Output, MAR and Recent Results.

## 2020-02-08 NOTE — PROGRESS NOTES
Infectious Diseases Progress Note    Antibiotic Summary:  Rocephin 2/ @ 1100 hr x 1 dose (at [facility])  Cipro  2 PM --  AM x 2 doses  Rocephin / @ 1100 hr x 1 dose  Cefepime  --  AM  Rocephin 8 AM x 1 dose      Subjective:     She feels better with no new symptoms    ROS:  Constitutional: no fever or rigors  HEENT: no sore throat  Skin: no pruritis or rash  Resp: no cough or SOB  CV: no CP or symptoms of CHF  GI: No N, V, D  : no dysuria    Objective:     Vitals:   Visit Vitals  /72 (BP 1 Location: Left arm, BP Patient Position: At rest)   Pulse (!) 110   Temp 98.6 °F (37 °C)   Resp 16   Ht 5' 7\" (1.702 m)   Wt 66.5 kg (146 lb 9.7 oz)   LMP 2020   SpO2 98%   BMI 22.96 kg/m²        Tmax:  Temp (24hrs), Av.8 °F (37.1 °C), Min:98.6 °F (37 °C), Max:99.1 °F (37.3 °C)      Exam:  General appearance: alert, no distress  Throat: oropharynx benign  Back: no CVAT  Lungs: clear to auscultation bilaterally  Heart: regular rate and rhythm  Abdomen: soft, non-tender. Bowel sounds normal. No masses,  no organomegaly  Skin: no rash  Neurologic: Grossly normal    IV Lines: peripheral    Labs:    Recent Labs     20  0948 20  1034   WBC 9.4 10.4   HGB 12.7 12.5    196   BUN 6 9   CREA 0.66 0.73   TBILI 0.3 0.6   SGOT 14* 14*   AP 60 74     Urine culture 2/ = >100,000 colonies E coli    BLOOD CULTURES:   2/ = GNR in 1 of 4 bottles ( will likely be E coli)    Assessment:     1. Left pyelonephritis with GN bacteremia -- improving: I suspect the GNR in the blood culture will also be E coli    Plan:     1. Dose of Rocephin in AM     2. If the GNR in the blood culture is also quinolone sensitive E coli and she feels well in AM, she could go home  after the AM dose of Rocephin on Cipro 500 mg po bid thru the AM dose on  -- she already has 12 Cipro pills and would need a script for 11 more pills.  I reviewed Cipro side effects including tendon inflammation and rupture, C diff, etc. I advised her to call me if symptoms arise      Call if there are problems tomorrow    [facility] President., MD

## 2020-02-08 NOTE — DISCHARGE SUMMARY
Inpatient hospitalist discharge summary                Brief Overview    PATIENT ID: Elder Corrales    MRN: [id number]     YOB: 1975    Admitting Provider: Chiquita Swanson MD    Discharging Provider: Maxwell Stearns MD   To contact this individual call [phone] and ask the  to page. If unavailable ask to be transferred the Adult Hospitalist Department. PCP at discharge: Other, MD Darlene None   Patient can only remember the practice name and not the phy*    Admission date: 2/6/2020  Date of Discharge: 02/08/20    Chief complaint:   Chief Complaint   Patient presents with    Abnormal Lab Results     Patient Active Problem List   Diagnosis Code    Bacteremia R78.81         Discharge diagnosis, hospital course/plan:  1.  Pyelonephritis, left: POA  Urine and BC X 2 PTA grow Pan sensitive E.Coli. Dr. Fritz Click recs to DC patient on Cipro 500 mg po BID till 2/19/2020. Probiotics added     2.  E.Coli sec to Pyelonephritis, POA  Management as above       On the date of discharge, diagnostic face to face encounter was performed. Patient was hemodynamically stable, offering no new complaints. Denies any shortness of breath at rest, no fevers or chills, no diarrhea or constipation. Patient is agreeable for discharge. Patient understood and verbalized the understanding of the discharge plan. Patient was advised to seek medical help/ care or return to ED, if symptoms recur, worsen or new symptoms develop. Discharge Disposition:  Home or Self Care    Discharge activity:  Activity as tolerated    Code status at discharge:  Full Code     Active issues requiring follow up:  BC x 2    Outpatient follow up:      Future appointments-  No future appointments.   Follow-up Information     Follow up With Specialties Details Why Lam Gunter MD Infectious Diseases Schedule an appointment as soon as possible for a visit as recommended [address] [place] [address] [address] [facility]  [phone]      Follow up with PCP in a week              Test results pending upon discharge:  BC x 2    Operative procedures performed:      Treatments: IV hydration and antibiotics: ceftriaxone    Consults:  IP CONSULT TO INFECTIOUS DISEASES    Procedures:    * No surgery found *    Diet:  DIET REGULAR    Pertinent test results:  Ct Abd Pelv Wo Cont    Result Date: 2/5/2020  EXAM: CT ABD PELV WO CONT INDICATION: left flank pain x 1 week with fever COMPARISON: None CONTRAST:  None. TECHNIQUE: Thin axial images were obtained through the abdomen and pelvis. Coronal and sagittal reconstructions were generated. Oral contrast was not administered. CT dose reduction was achieved through use of a standardized protocol tailored for this examination and automatic exposure control for dose modulation. The absence of intravenous contrast material reduces the sensitivity for evaluation of the solid parenchymal organs of the abdomen. FINDINGS: LUNG BASES: There is a left lower lobe pulmonary nodule measuring 2.5 mm on image 4-4. The lung bases are otherwise clear. INCIDENTALLY IMAGED HEART AND MEDIASTINUM: Breast implants are partially visualized. LIVER: There is a single low-density oval lesion in the right lobe of the liver measuring 1 cm, image 2-19 and coronal image 51, indeterminate due to its small size and absence of contrast material. The liver is otherwise normal. GALLBLADDER: Unremarkable. SPLEEN: No mass. PANCREAS: No mass or ductal dilatation. ADRENALS: Unremarkable. KIDNEYS/URETERS: No mass, calculus, or hydronephrosis. There is left-sided perinephric inflammation/edema. No focal fluid collections are identified. STOMACH: Unremarkable. SMALL BOWEL: No bowel obstruction or perforation COLON: No bowel obstruction or perforation APPENDIX: Unremarkable. PERITONEUM: No ascites or pneumoperitoneum. RETROPERITONEUM: No lymphadenopathy or aortic aneurysm.  REPRODUCTIVE ORGANS: The uterus is noted. There is no significant pelvic free fluid. URINARY BLADDER: No mass or calculus. BONES: No destructive bone lesion. ADDITIONAL COMMENTS: N/A     IMPRESSION: 1. No evidence of genitourinary stone or hydronephrosis. 2. Mild left-sided perinephric edema can be seen witha genitourinary infection. Recommend correlation with urinalysis and urinary culture analysis. 3. Incidentally noted is a 2.5 mm left lower lobe pulmonary nodule. 4. Incidentally noted is a 1 cm oval low-density liver lesion. Guidelines by the Fleischner society (Radiology 2005: 184:078-071) suggest that patients with a low risk for lung cancer who have nodules less than or equal to 4 mm in diameter require no follow-up. In patients with a  higher risk, such as smokers, follow-up is recommended in one year. Patients with a known malignancy are at increased risk for metastasis and should receive three month follow-up. Recent Results (from the past 336 hour(s))   CBC WITH AUTOMATED DIFF    Collection Time: 02/05/20 10:34 AM   Result Value Ref Range    WBC 10.4 3.6 - 11.0 K/uL    RBC 3.85 3.80 - 5.20 M/uL    HGB 12.5 11.5 - 16.0 g/dL    HCT 36.9 35.0 - 47.0 %    MCV 95.8 80.0 - 99.0 FL    MCH 32.5 26.0 - 34.0 PG    MCHC 33.9 30.0 - 36.5 g/dL    RDW 11.4 (L) 11.5 - 14.5 %    PLATELET 871 965 - 015 K/uL    MPV 9.1 8.9 - 12.9 FL    NRBC 0.0 0  WBC    ABSOLUTE NRBC 0.00 0.00 - 0.01 K/uL    NEUTROPHILS 80 (H) 32 - 75 %    LYMPHOCYTES 13 12 - 49 %    MONOCYTES 6 5 - 13 %    EOSINOPHILS 0 0 - 7 %    BASOPHILS 0 0 - 1 %    IMMATURE GRANULOCYTES 1 (H) 0.0 - 0.5 %    ABS. NEUTROPHILS 8.4 (H) 1.8 - 8.0 K/UL    ABS. LYMPHOCYTES 1.3 0.8 - 3.5 K/UL    ABS. MONOCYTES 0.6 0.0 - 1.0 K/UL    ABS. EOSINOPHILS 0.0 0.0 - 0.4 K/UL    ABS. BASOPHILS 0.0 0.0 - 0.1 K/UL    ABS. IMM.  GRANS. 0.1 (H) 0.00 - 0.04 K/UL    DF AUTOMATED     METABOLIC PANEL, COMPREHENSIVE    Collection Time: 02/05/20 10:34 AM   Result Value Ref Range    Sodium 134 (L) 136 - 145 mmol/L    Potassium 3.4 (L) 3.5 - 5.1 mmol/L    Chloride 97 97 - 108 mmol/L    CO2 24 21 - 32 mmol/L    Anion gap 13 5 - 15 mmol/L    Glucose 122 (H) 65 - 100 mg/dL    BUN 9 6 - 20 MG/DL    Creatinine 0.73 0.55 - 1.02 MG/DL    BUN/Creatinine ratio 12 12 - 20      GFR est AA >60 >60 ml/min/1.73m2    GFR est non-AA >60 >60 ml/min/1.73m2    Calcium 8.6 8.5 - 10.1 MG/DL    Bilirubin, total 0.6 0.2 - 1.0 MG/DL    ALT (SGPT) 23 12 - 78 U/L    AST (SGOT) 14 (L) 15 - 37 U/L    Alk.  phosphatase 74 45 - 117 U/L    Protein, total 7.5 6.4 - 8.2 g/dL    Albumin 3.3 (L) 3.5 - 5.0 g/dL    Globulin 4.2 (H) 2.0 - 4.0 g/dL    A-G Ratio 0.8 (L) 1.1 - 2.2     MAGNESIUM    Collection Time: 02/05/20 10:34 AM   Result Value Ref Range    Magnesium 1.9 1.6 - 2.4 mg/dL   URINALYSIS W/MICROSCOPIC    Collection Time: 02/05/20 10:34 AM   Result Value Ref Range    Color YELLOW/STRAW      Appearance HAZY (A) CLEAR      Specific gravity 1.005 1.003 - 1.030      pH (UA) 7.0 5.0 - 8.0      Protein NEGATIVE  NEG mg/dL    Glucose NEGATIVE  NEG mg/dL    Ketone NEGATIVE  NEG mg/dL    Bilirubin NEGATIVE  NEG      Blood SMALL (A) NEG      Urobilinogen 0.2 0.2 - 1.0 EU/dL    Nitrites NEGATIVE  NEG      Leukocyte Esterase LARGE (A) NEG      WBC  0 - 4 /hpf    RBC 5-10 0 - 5 /hpf    Epithelial cells MODERATE (A) FEW /lpf    Bacteria 4+ (A) NEG /hpf   CULTURE, URINE    Collection Time: 02/05/20 10:34 AM   Result Value Ref Range    Special Requests: NO SPECIAL REQUESTS      [facility] Count >100,000  COLONIES/mL        Culture result: ESCHERICHIA COLI (A)         Susceptibility    Escherichia coli - ILYA     Amikacin ($) <=16 Susceptible ug/mL     Ampicillin ($) <=8 Susceptible ug/mL     Ampicillin/sulbactam ($) <=8/4 Susceptible ug/mL     Aztreonam ($$$$) <=4 Susceptible ug/mL     Cefazolin ($) <=8 Susceptible ug/mL     Cefepime ($$) <=4 Susceptible ug/mL     Cefotaxime <=2 Susceptible ug/mL     Ceftazidime ($) <=1 Susceptible ug/mL     Ceftriaxone ($) <=1 Susceptible ug/mL     Cefuroxime ($) <=4 Susceptible ug/mL     Ciprofloxacin ($) <=1 Susceptible ug/mL     Gentamicin ($) <=4 Susceptible ug/mL     Imipenem <=1 Susceptible ug/mL     Levofloxacin ($) <=2 Susceptible ug/mL     Meropenem ($$) <=1 Susceptible ug/mL     Nitrofurantoin <=32 Susceptible ug/mL     Piperacillin/Tazobac ($) <=16 Susceptible ug/mL     Tobramycin ($) <=4 Susceptible ug/mL     Trimeth/Sulfa <=2/38 Susceptible ug/mL   LACTIC ACID    Collection Time: 02/05/20 10:34 AM   Result Value Ref Range    Lactic acid 0.9 0.4 - 2.0 MMOL/L   SAMPLES BEING HELD    Collection Time: 02/05/20 10:34 AM   Result Value Ref Range    SAMPLES BEING HELD 1RED, 1BLUE, 2BC     COMMENT        Add-on orders for these samples will be processed based on acceptable specimen integrity and analyte stability, which may vary by analyte. CULTURE, BLOOD, PAIRED    Collection Time: 02/05/20 12:00 PM   Result Value Ref Range    Special Requests: NO SPECIAL REQUESTS      Culture result: (A)       ESCHERICHIA COLI GROWING IN 1 OF 4 BOTTLES DRAWN . [facility] SITE    Culture result: (A)       PRELIMINARY REPORT OF APPARENT GRAM NEGATIVE RODS GROWING IN 1 OF 4 BOTTLES DRAWN CALLED TO AND READ BACK BY DR. Haritha TORREZ 0600 02/06/20 HR    Culture result: REMAINING BOTTLE(S) HAS/HAVE NO GROWTH SO FAR         Susceptibility    Escherichia coli - ILYA     Amikacin ($) <=16 Susceptible ug/mL     Ampicillin ($) <=8 Susceptible ug/mL     Ampicillin/sulbactam ($) <=8/4 Susceptible ug/mL     Aztreonam ($$$$) <=4 Susceptible ug/mL     Cefazolin ($) <=8 Susceptible ug/mL     Ceftazidime ($) <=1 Susceptible ug/mL     Ceftriaxone ($) <=1 Susceptible ug/mL     Cefuroxime ($) <=4 Susceptible ug/mL     Cefotaxime <=2 Susceptible ug/mL     Cefepime ($$) <=4 Susceptible ug/mL     Ciprofloxacin ($) <=1 Susceptible ug/mL     Gentamicin ($) <=4 Susceptible ug/mL     Imipenem <=1 Susceptible ug/mL     Levofloxacin ($) <=2 Susceptible ug/mL     Meropenem ($$) <=1 Susceptible ug/mL     Piperacillin/Tazobac ($) <=16 Susceptible ug/mL     Tobramycin ($) <=4 Susceptible ug/mL     Trimeth/Sulfa <=2/38 Susceptible ug/mL   CBC WITH AUTOMATED DIFF    Collection Time: 02/06/20  9:48 AM   Result Value Ref Range    WBC 9.4 3.6 - 11.0 K/uL    RBC 3.83 3.80 - 5.20 M/uL    HGB 12.7 11.5 - 16.0 g/dL    HCT 38.2 35.0 - 47.0 %    MCV 99.7 (H) 80.0 - 99.0 FL    MCH 33.2 26.0 - 34.0 PG    MCHC 33.2 30.0 - 36.5 g/dL    RDW 11.4 (L) 11.5 - 14.5 %    PLATELET 269 568 - 975 K/uL    MPV 9.5 8.9 - 12.9 FL    NRBC 0.0 0  WBC    ABSOLUTE NRBC 0.00 0.00 - 0.01 K/uL    NEUTROPHILS 62 32 - 75 %    LYMPHOCYTES 25 12 - 49 %    MONOCYTES 11 5 - 13 %    EOSINOPHILS 1 0 - 7 %    BASOPHILS 0 0 - 1 %    IMMATURE GRANULOCYTES 1 (H) 0.0 - 0.5 %    ABS. NEUTROPHILS 5.9 1.8 - 8.0 K/UL    ABS. LYMPHOCYTES 2.4 0.8 - 3.5 K/UL    ABS. MONOCYTES 1.0 0.0 - 1.0 K/UL    ABS. EOSINOPHILS 0.1 0.0 - 0.4 K/UL    ABS. BASOPHILS 0.0 0.0 - 0.1 K/UL    ABS. IMM. GRANS. 0.1 (H) 0.00 - 0.04 K/UL    DF AUTOMATED     METABOLIC PANEL, COMPREHENSIVE    Collection Time: 02/06/20  9:48 AM   Result Value Ref Range    Sodium 137 136 - 145 mmol/L    Potassium 4.1 3.5 - 5.1 mmol/L    Chloride 107 97 - 108 mmol/L    CO2 25 21 - 32 mmol/L    Anion gap 5 5 - 15 mmol/L    Glucose 94 65 - 100 mg/dL    BUN 6 6 - 20 MG/DL    Creatinine 0.66 0.55 - 1.02 MG/DL    BUN/Creatinine ratio 9 (L) 12 - 20      GFR est AA >60 >60 ml/min/1.73m2    GFR est non-AA >60 >60 ml/min/1.73m2    Calcium 8.7 8.5 - 10.1 MG/DL    Bilirubin, total 0.3 0.2 - 1.0 MG/DL    ALT (SGPT) 18 12 - 78 U/L    AST (SGOT) 14 (L) 15 - 37 U/L    Alk.  phosphatase 60 45 - 117 U/L    Protein, total 7.3 6.4 - 8.2 g/dL    Albumin 2.9 (L) 3.5 - 5.0 g/dL    Globulin 4.4 (H) 2.0 - 4.0 g/dL    A-G Ratio 0.7 (L) 1.1 - 2.2     LACTIC ACID    Collection Time: 02/06/20  9:48 AM   Result Value Ref Range    Lactic acid 1.0 0.4 - 2.0 MMOL/L   CULTURE, BLOOD, PAIRED    Collection Time: 02/06/20 9:48 AM   Result Value Ref Range    Special Requests: NO SPECIAL REQUESTS      Culture result: NO GROWTH 2 DAYS     CULTURE, URINE    Collection Time: 02/06/20  9:48 AM   Result Value Ref Range    Special Requests: NO SPECIAL REQUESTS      Culture result: No growth (<1,000 CFU/ML)     CBC WITH AUTOMATED DIFF    Collection Time: 02/08/20  5:15 AM   Result Value Ref Range    WBC 6.2 3.6 - 11.0 K/uL    RBC 3.61 (L) 3.80 - 5.20 M/uL    HGB 11.8 11.5 - 16.0 g/dL    HCT 35.8 35.0 - 47.0 %    MCV 99.2 (H) 80.0 - 99.0 FL    MCH 32.7 26.0 - 34.0 PG    MCHC 33.0 30.0 - 36.5 g/dL    RDW 11.2 (L) 11.5 - 14.5 %    PLATELET 415 625 - 519 K/uL    MPV 9.3 8.9 - 12.9 FL    NRBC 0.0 0  WBC    ABSOLUTE NRBC 0.00 0.00 - 0.01 K/uL    NEUTROPHILS 55 32 - 75 %    LYMPHOCYTES 31 12 - 49 %    MONOCYTES 9 5 - 13 %    EOSINOPHILS 3 0 - 7 %    BASOPHILS 1 0 - 1 %    IMMATURE GRANULOCYTES 1 (H) 0.0 - 0.5 %    ABS. NEUTROPHILS 3.5 1.8 - 8.0 K/UL    ABS. LYMPHOCYTES 1.9 0.8 - 3.5 K/UL    ABS. MONOCYTES 0.6 0.0 - 1.0 K/UL    ABS. EOSINOPHILS 0.2 0.0 - 0.4 K/UL    ABS. BASOPHILS 0.0 0.0 - 0.1 K/UL    ABS. IMM.  GRANS. 0.0 0.00 - 0.04 K/UL    DF AUTOMATED     METABOLIC PANEL, BASIC    Collection Time: 02/08/20  5:15 AM   Result Value Ref Range    Sodium 139 136 - 145 mmol/L    Potassium 3.9 3.5 - 5.1 mmol/L    Chloride 110 (H) 97 - 108 mmol/L    CO2 23 21 - 32 mmol/L    Anion gap 6 5 - 15 mmol/L    Glucose 97 65 - 100 mg/dL    BUN 9 6 - 20 MG/DL    Creatinine 0.63 0.55 - 1.02 MG/DL    BUN/Creatinine ratio 14 12 - 20      GFR est AA >60 >60 ml/min/1.73m2    GFR est non-AA >60 >60 ml/min/1.73m2    Calcium 8.5 8.5 - 10.1 MG/DL           Physical Exam on Discharge:    Discharge condition: good    Vital signs:   Patient Vitals for the past 12 hrs:   Temp Pulse Resp BP SpO2   02/08/20 0400 98.3 °F (36.8 °C) 99 16 104/71 98 %   02/07/20 2019 98.6 °F (37 °C) (!) 110 16 118/72 98 %       General appearance: alert, cooperative, no distress, appears stated age  Lungs: clear to auscultation bilaterally    Current Discharge Medication List      START taking these medications    Details   L.acidoph & parac-S.therm-Bifido (ALMA Q2/RISAQUAD-2) 16 billion cell cap cap Take 1 Cap by mouth daily for 14 days. Qty: 14 Cap, Refills: 0         CONTINUE these medications which have CHANGED    Details   ciprofloxacin HCl (CIPRO) 500 mg tablet Take 1 Tab by mouth two (2) times a day for 11 days. Qty: 22 Tab, Refills: 0         CONTINUE these medications which have NOT CHANGED    Details   multivitamin-folic acid-biotin (HAIR-SKIN-NAILS, MV-FA-BIOTIN,) 400-2,000 mcg tab Take 1 Tab by mouth daily. ketorolac (TORADOL) 10 mg tablet Take 1 Tab by mouth every six (6) hours as needed for Pain. Qty: 20 Tab, Refills: 0      DESOGESTREL-ETHINYL ESTRADIOL (APRI PO) Take 1 tablet by mouth daily.                Total time spent on discharge planning, counseling and co-ordination of care:   31 minutes    Cherry Palafox MD  02/08/20  7:51 AM

## 2020-02-10 LAB
BACTERIA SPEC CULT: ABNORMAL
SERVICE CMNT-IMP: ABNORMAL

## 2020-02-11 LAB
BACTERIA SPEC CULT: NORMAL
SERVICE CMNT-IMP: NORMAL

## 2021-10-15 NOTE — ED TRIAGE NOTES
Pt seen yesterday for pyelonephritis and was called this am and told that her blood culture was positive and was told to come back, pt stated she is feeling better done

## 2022-03-19 PROBLEM — R78.81 BACTEREMIA: Status: ACTIVE | Noted: 2020-02-06

## 2022-07-28 ENCOUNTER — TRANSCRIBE ORDER (OUTPATIENT)
Dept: SCHEDULING | Age: 47
End: 2022-07-28

## 2022-07-28 DIAGNOSIS — S82.62XA CLOSED DISPLACED FRACTURE OF LATERAL MALLEOLUS OF LEFT FIBULA: Primary | ICD-10-CM

## 2022-07-28 DIAGNOSIS — S82.392A FRACTURE, POSTERIOR MALLEOLUS, LEFT, CLOSED, INITIAL ENCOUNTER: ICD-10-CM

## 2022-07-29 ENCOUNTER — HOSPITAL ENCOUNTER (OUTPATIENT)
Dept: CT IMAGING | Age: 47
Discharge: HOME | End: 2022-07-29
Attending: STUDENT IN AN ORGANIZED HEALTH CARE EDUCATION/TRAINING PROGRAM
Payer: COMMERCIAL

## 2022-07-29 DIAGNOSIS — S82.392A FRACTURE, POSTERIOR MALLEOLUS, LEFT, CLOSED, INITIAL ENCOUNTER: ICD-10-CM

## 2022-07-29 DIAGNOSIS — S82.62XA CLOSED DISPLACED FRACTURE OF LATERAL MALLEOLUS OF LEFT FIBULA: ICD-10-CM

## 2022-07-29 PROCEDURE — 73700 CT LOWER EXTREMITY W/O DYE: CPT

## 2022-08-02 RX ORDER — TRAMADOL HYDROCHLORIDE 50 MG/1
50 TABLET ORAL
Status: ON HOLD | COMMUNITY
End: 2022-08-05 | Stop reason: SDUPTHER

## 2022-08-02 RX ORDER — CETIRIZINE HCL 10 MG
TABLET ORAL DAILY
COMMUNITY

## 2022-08-02 RX ORDER — HYDROCODONE BITARTRATE AND ACETAMINOPHEN 5; 325 MG/1; MG/1
TABLET ORAL
COMMUNITY
End: 2022-08-05

## 2022-08-02 NOTE — PERIOP NOTES
[facility]  Preoperative Instructions        Surgery Date 8/5/22          Time of Arrival:  To Be Called  Contact # [phone]    1. On the day of your surgery, please report to the Surgical Services Registration Desk and sign in at your designated time. The Surgery Center is located to the right of the Emergency Room. 2. You must have someone with you to drive you home. You should not drive a car for 24 hours following surgery. Please make arrangements for a friend or family member to stay with you for the first 24 hours after your surgery. 3. Do not have anything to eat or drink (including water, gum, mints, coffee, juice) after midnight ?8/4/22? Judy Stone ? This may not apply to medications prescribed by your physician. ?(Please note below the special instructions with medications to take the morning of your procedure.)    4. We recommend you do not drink any alcoholic beverages for 24 hours before and after your surgery. 5. Contact your surgeons office for instructions on the following medications: non-steroidal anti-inflammatory drugs (i.e. Advil, Aleve), vitamins, and supplements. (Some surgeons will want you to stop these medications prior to surgery and others may allow you to take them)  **If you are currently taking Plavix, Coumadin, Aspirin and/or other blood-thinning agents, contact your surgeon for instructions. ** Your surgeon will partner with the physician prescribing these medications to determine if it is safe to stop or if you need to continue taking. Please do not stop taking these medications without instructions from your surgeon    6. Wear comfortable clothes. Wear glasses instead of contacts. Do not bring any money or jewelry. Please bring picture ID, insurance card, and any prearranged co-payment or hospital payment. Do not wear make-up, particularly mascara the morning of your surgery.   Do not wear nail polish, particularly if you are having foot /hand surgery. Wear your hair loose or down, no ponytails, buns, karolyn pins or clips. All body piercings must be removed. Please shower with antibacterial soap for three consecutive days before and on the morning of surgery, but do not apply any lotions, powders or deodorants after the shower on the day of surgery. Please use a fresh towels after each shower. Please sleep in clean clothes and change bed linens the night before surgery. Please do not shave for 48 hours prior to surgery. Shaving of the face is acceptable. 7. You should understand that if you do not follow these instructions your surgery may be cancelled. If your physical condition changes (I.e. fever, cold or flu) please contact your surgeon as soon as possible. 8. It is important that you be on time. If a situation occurs where you may be late, please call [phone] (OR Holding Area). 9. If you have any questions and or problems, please call [phone] (Pre-admission Testing). 10. Your surgery time may be subject to change. You will receive a phone call the evening prior if your time changes. 11.  If having outpatient surgery, you must have someone to drive you here, stay with you during the duration of your stay, and to drive you home at time of discharge. 12.  Due to current COVID restrictions, only ONE adult may accompany you the day of the procedure. We have limited seating available. If our waiting room is at capacity, your ride may be asked to remain in their vehicle. No children are allowed in the waiting room. Special Instructions:     TAKE ALL MEDICATIONS DAY OF SURGERY EXCEPT:  Multivitamins      I understand a pre-operative phone call will be made to verify my surgery time. In the event that I am not available, I give permission for a message to be left on my answering service and/or with another person?   yes         ___________________      __________   _________    Soren Ruddy of Patient) (Witness)                (Date and Time)

## 2022-08-05 ENCOUNTER — APPOINTMENT (OUTPATIENT)
Dept: GENERAL RADIOLOGY | Age: 47
End: 2022-08-05
Attending: STUDENT IN AN ORGANIZED HEALTH CARE EDUCATION/TRAINING PROGRAM
Payer: COMMERCIAL

## 2022-08-05 ENCOUNTER — HOSPITAL ENCOUNTER (OUTPATIENT)
Age: 47
Setting detail: OUTPATIENT SURGERY
Discharge: HOME | End: 2022-08-05
Attending: STUDENT IN AN ORGANIZED HEALTH CARE EDUCATION/TRAINING PROGRAM | Admitting: STUDENT IN AN ORGANIZED HEALTH CARE EDUCATION/TRAINING PROGRAM
Payer: COMMERCIAL

## 2022-08-05 ENCOUNTER — ANESTHESIA EVENT (OUTPATIENT)
Dept: SURGERY | Age: 47
End: 2022-08-05
Payer: COMMERCIAL

## 2022-08-05 ENCOUNTER — ANESTHESIA (OUTPATIENT)
Dept: SURGERY | Age: 47
End: 2022-08-05
Payer: COMMERCIAL

## 2022-08-05 VITALS
TEMPERATURE: 98 F | SYSTOLIC BLOOD PRESSURE: 115 MMHG | DIASTOLIC BLOOD PRESSURE: 65 MMHG | HEIGHT: 67 IN | HEART RATE: 95 BPM | OXYGEN SATURATION: 98 % | BODY MASS INDEX: 27.51 KG/M2 | RESPIRATION RATE: 16 BRPM | WEIGHT: 175.27 LBS

## 2022-08-05 DIAGNOSIS — S82.892A CLOSED FRACTURE OF LEFT ANKLE, INITIAL ENCOUNTER: Primary | ICD-10-CM

## 2022-08-05 LAB — HCG UR QL: NEGATIVE

## 2022-08-05 PROCEDURE — 77030000032 HC CUF TRNQT ZIMM -B: Performed by: STUDENT IN AN ORGANIZED HEALTH CARE EDUCATION/TRAINING PROGRAM

## 2022-08-05 PROCEDURE — 77030013079 HC BLNKT BAIR HGGR 3M -A: Performed by: NURSE ANESTHETIST, CERTIFIED REGISTERED

## 2022-08-05 PROCEDURE — 74011250636 HC RX REV CODE- 250/636: Performed by: STUDENT IN AN ORGANIZED HEALTH CARE EDUCATION/TRAINING PROGRAM

## 2022-08-05 PROCEDURE — 2709999900 HC NON-CHARGEABLE SUPPLY: Performed by: STUDENT IN AN ORGANIZED HEALTH CARE EDUCATION/TRAINING PROGRAM

## 2022-08-05 PROCEDURE — 73630 X-RAY EXAM OF FOOT: CPT

## 2022-08-05 PROCEDURE — 77030031139 HC SUT VCRL2 J&J -A: Performed by: STUDENT IN AN ORGANIZED HEALTH CARE EDUCATION/TRAINING PROGRAM

## 2022-08-05 PROCEDURE — 74011000250 HC RX REV CODE- 250: Performed by: ANESTHESIOLOGY

## 2022-08-05 PROCEDURE — 77030002916 HC SUT ETHLN J&J -A: Performed by: STUDENT IN AN ORGANIZED HEALTH CARE EDUCATION/TRAINING PROGRAM

## 2022-08-05 PROCEDURE — 74011250636 HC RX REV CODE- 250/636: Performed by: ANESTHESIOLOGY

## 2022-08-05 PROCEDURE — 76210000021 HC REC RM PH II 0.5 TO 1 HR: Performed by: STUDENT IN AN ORGANIZED HEALTH CARE EDUCATION/TRAINING PROGRAM

## 2022-08-05 PROCEDURE — 77030039135 HC KT DISP FIBRTAK ARTH -D: Performed by: STUDENT IN AN ORGANIZED HEALTH CARE EDUCATION/TRAINING PROGRAM

## 2022-08-05 PROCEDURE — 74011000636 HC RX REV CODE- 636: Performed by: STUDENT IN AN ORGANIZED HEALTH CARE EDUCATION/TRAINING PROGRAM

## 2022-08-05 PROCEDURE — 77030026438 HC STYL ET INTUB CARD -A: Performed by: NURSE ANESTHETIST, CERTIFIED REGISTERED

## 2022-08-05 PROCEDURE — 77030008684 HC TU ET CUF COVD -B: Performed by: NURSE ANESTHETIST, CERTIFIED REGISTERED

## 2022-08-05 PROCEDURE — 76010000132 HC OR TIME 2.5 TO 3 HR: Performed by: STUDENT IN AN ORGANIZED HEALTH CARE EDUCATION/TRAINING PROGRAM

## 2022-08-05 PROCEDURE — C1713 ANCHOR/SCREW BN/BN,TIS/BN: HCPCS | Performed by: STUDENT IN AN ORGANIZED HEALTH CARE EDUCATION/TRAINING PROGRAM

## 2022-08-05 PROCEDURE — 77030002933 HC SUT MCRYL J&J -A: Performed by: STUDENT IN AN ORGANIZED HEALTH CARE EDUCATION/TRAINING PROGRAM

## 2022-08-05 PROCEDURE — 74011250637 HC RX REV CODE- 250/637: Performed by: STUDENT IN AN ORGANIZED HEALTH CARE EDUCATION/TRAINING PROGRAM

## 2022-08-05 PROCEDURE — 74011250636 HC RX REV CODE- 250/636: Performed by: NURSE ANESTHETIST, CERTIFIED REGISTERED

## 2022-08-05 PROCEDURE — 74011000250 HC RX REV CODE- 250: Performed by: STUDENT IN AN ORGANIZED HEALTH CARE EDUCATION/TRAINING PROGRAM

## 2022-08-05 PROCEDURE — 76000 FLUOROSCOPY <1 HR PHYS/QHP: CPT

## 2022-08-05 PROCEDURE — 81025 URINE PREGNANCY TEST: CPT

## 2022-08-05 PROCEDURE — 74011000250 HC RX REV CODE- 250: Performed by: NURSE ANESTHETIST, CERTIFIED REGISTERED

## 2022-08-05 PROCEDURE — 76060000036 HC ANESTHESIA 2.5 TO 3 HR: Performed by: STUDENT IN AN ORGANIZED HEALTH CARE EDUCATION/TRAINING PROGRAM

## 2022-08-05 PROCEDURE — 76210000017 HC OR PH I REC 1.5 TO 2 HR: Performed by: STUDENT IN AN ORGANIZED HEALTH CARE EDUCATION/TRAINING PROGRAM

## 2022-08-05 DEVICE — 3.5 X 14 MM R3CON NON-LOCKING PLATE SCREW
Type: IMPLANTABLE DEVICE | Site: ANKLE | Status: FUNCTIONAL
Brand: GORILLA PLATING SYSTEM

## 2022-08-05 DEVICE — 3.5 X 12 MM R3CON NON-LOCKING PLATE SCREW
Type: IMPLANTABLE DEVICE | Site: ANKLE | Status: FUNCTIONAL
Brand: GORILLA PLATING SYSTEM

## 2022-08-05 DEVICE — SYSTEM IMPL TI UHMWPE KNOTLESS FOR SYNDESMOSIS FIX: Type: IMPLANTABLE DEVICE | Site: ANKLE | Status: FUNCTIONAL

## 2022-08-05 DEVICE — 2.7 X 20 MM R3CON NON-LOCKING PLATE SCREW
Type: IMPLANTABLE DEVICE | Site: ANKLE | Status: FUNCTIONAL
Brand: GORILLA PLATING SYSTEM

## 2022-08-05 RX ORDER — PROPOFOL 10 MG/ML
INJECTION, EMULSION INTRAVENOUS AS NEEDED
Status: DISCONTINUED | OUTPATIENT
Start: 2022-08-05 | End: 2022-08-05 | Stop reason: HOSPADM

## 2022-08-05 RX ORDER — ROPIVACAINE HYDROCHLORIDE 5 MG/ML
INJECTION, SOLUTION EPIDURAL; INFILTRATION; PERINEURAL
Status: COMPLETED | OUTPATIENT
Start: 2022-08-05 | End: 2022-08-05

## 2022-08-05 RX ORDER — FENTANYL CITRATE 50 UG/ML
50 INJECTION, SOLUTION INTRAMUSCULAR; INTRAVENOUS AS NEEDED
Status: DISCONTINUED | OUTPATIENT
Start: 2022-08-05 | End: 2022-08-05 | Stop reason: HOSPADM

## 2022-08-05 RX ORDER — HYDROMORPHONE HYDROCHLORIDE 2 MG/ML
INJECTION, SOLUTION INTRAMUSCULAR; INTRAVENOUS; SUBCUTANEOUS AS NEEDED
Status: DISCONTINUED | OUTPATIENT
Start: 2022-08-05 | End: 2022-08-05 | Stop reason: HOSPADM

## 2022-08-05 RX ORDER — SODIUM CHLORIDE, SODIUM LACTATE, POTASSIUM CHLORIDE, CALCIUM CHLORIDE 600; 310; 30; 20 MG/100ML; MG/100ML; MG/100ML; MG/100ML
25 INJECTION, SOLUTION INTRAVENOUS CONTINUOUS
Status: DISCONTINUED | OUTPATIENT
Start: 2022-08-05 | End: 2022-08-05 | Stop reason: HOSPADM

## 2022-08-05 RX ORDER — SUCCINYLCHOLINE CHLORIDE 20 MG/ML
INJECTION INTRAMUSCULAR; INTRAVENOUS AS NEEDED
Status: DISCONTINUED | OUTPATIENT
Start: 2022-08-05 | End: 2022-08-05 | Stop reason: HOSPADM

## 2022-08-05 RX ORDER — ONDANSETRON 4 MG/1
4 TABLET, ORALLY DISINTEGRATING ORAL
Qty: 10 TABLET | Refills: 0 | Status: SHIPPED | OUTPATIENT
Start: 2022-08-05

## 2022-08-05 RX ORDER — FENTANYL CITRATE 50 UG/ML
INJECTION, SOLUTION INTRAMUSCULAR; INTRAVENOUS AS NEEDED
Status: DISCONTINUED | OUTPATIENT
Start: 2022-08-05 | End: 2022-08-05 | Stop reason: HOSPADM

## 2022-08-05 RX ORDER — TRAMADOL HYDROCHLORIDE 50 MG/1
50 TABLET ORAL
Qty: 30 TABLET | Refills: 0 | Status: SHIPPED | OUTPATIENT
Start: 2022-08-05 | End: 2022-08-12

## 2022-08-05 RX ORDER — ONDANSETRON 2 MG/ML
4 INJECTION INTRAMUSCULAR; INTRAVENOUS AS NEEDED
Status: DISCONTINUED | OUTPATIENT
Start: 2022-08-05 | End: 2022-08-05 | Stop reason: HOSPADM

## 2022-08-05 RX ORDER — HYDROMORPHONE HYDROCHLORIDE 1 MG/ML
.2-.5 INJECTION, SOLUTION INTRAMUSCULAR; INTRAVENOUS; SUBCUTANEOUS
Status: DISCONTINUED | OUTPATIENT
Start: 2022-08-05 | End: 2022-08-05 | Stop reason: HOSPADM

## 2022-08-05 RX ORDER — DEXAMETHASONE SODIUM PHOSPHATE 4 MG/ML
INJECTION, SOLUTION INTRA-ARTICULAR; INTRALESIONAL; INTRAMUSCULAR; INTRAVENOUS; SOFT TISSUE AS NEEDED
Status: DISCONTINUED | OUTPATIENT
Start: 2022-08-05 | End: 2022-08-05 | Stop reason: HOSPADM

## 2022-08-05 RX ORDER — DEXMEDETOMIDINE HYDROCHLORIDE 100 UG/ML
INJECTION, SOLUTION INTRAVENOUS AS NEEDED
Status: DISCONTINUED | OUTPATIENT
Start: 2022-08-05 | End: 2022-08-05 | Stop reason: HOSPADM

## 2022-08-05 RX ORDER — MIDAZOLAM HYDROCHLORIDE 1 MG/ML
INJECTION, SOLUTION INTRAMUSCULAR; INTRAVENOUS
Status: COMPLETED | OUTPATIENT
Start: 2022-08-05 | End: 2022-08-05

## 2022-08-05 RX ORDER — LIDOCAINE HYDROCHLORIDE 10 MG/ML
0.1 INJECTION, SOLUTION EPIDURAL; INFILTRATION; INTRACAUDAL; PERINEURAL AS NEEDED
Status: DISCONTINUED | OUTPATIENT
Start: 2022-08-05 | End: 2022-08-05 | Stop reason: HOSPADM

## 2022-08-05 RX ORDER — IBUPROFEN 200 MG
400 TABLET ORAL
COMMUNITY

## 2022-08-05 RX ORDER — ONDANSETRON 2 MG/ML
INJECTION INTRAMUSCULAR; INTRAVENOUS AS NEEDED
Status: DISCONTINUED | OUTPATIENT
Start: 2022-08-05 | End: 2022-08-05 | Stop reason: HOSPADM

## 2022-08-05 RX ORDER — LIDOCAINE HYDROCHLORIDE 20 MG/ML
INJECTION, SOLUTION EPIDURAL; INFILTRATION; INTRACAUDAL; PERINEURAL AS NEEDED
Status: DISCONTINUED | OUTPATIENT
Start: 2022-08-05 | End: 2022-08-05 | Stop reason: HOSPADM

## 2022-08-05 RX ORDER — MIDAZOLAM HYDROCHLORIDE 1 MG/ML
1 INJECTION, SOLUTION INTRAMUSCULAR; INTRAVENOUS AS NEEDED
Status: DISCONTINUED | OUTPATIENT
Start: 2022-08-05 | End: 2022-08-05 | Stop reason: HOSPADM

## 2022-08-05 RX ORDER — BUPIVACAINE HYDROCHLORIDE 5 MG/ML
INJECTION, SOLUTION EPIDURAL; INTRACAUDAL
Status: COMPLETED | OUTPATIENT
Start: 2022-08-05 | End: 2022-08-05

## 2022-08-05 RX ORDER — FENTANYL CITRATE 50 UG/ML
25 INJECTION, SOLUTION INTRAMUSCULAR; INTRAVENOUS
Status: DISCONTINUED | OUTPATIENT
Start: 2022-08-05 | End: 2022-08-05 | Stop reason: HOSPADM

## 2022-08-05 RX ORDER — DROPERIDOL 2.5 MG/ML
0.62 INJECTION, SOLUTION INTRAMUSCULAR; INTRAVENOUS ONCE
Status: COMPLETED | OUTPATIENT
Start: 2022-08-05 | End: 2022-08-05

## 2022-08-05 RX ADMIN — FENTANYL CITRATE 100 MCG: 50 INJECTION, SOLUTION INTRAMUSCULAR; INTRAVENOUS at 07:32

## 2022-08-05 RX ADMIN — SODIUM CHLORIDE, POTASSIUM CHLORIDE, SODIUM LACTATE AND CALCIUM CHLORIDE 25 ML/HR: 600; 310; 30; 20 INJECTION, SOLUTION INTRAVENOUS at 06:56

## 2022-08-05 RX ADMIN — ONDANSETRON 4 MG: 2 INJECTION INTRAMUSCULAR; INTRAVENOUS at 10:46

## 2022-08-05 RX ADMIN — BUPIVACAINE HYDROCHLORIDE 20 ML: 5 INJECTION, SOLUTION EPIDURAL; INTRACAUDAL; PERINEURAL at 07:15

## 2022-08-05 RX ADMIN — MIDAZOLAM HYDROCHLORIDE 2 MG: 1 INJECTION, SOLUTION INTRAMUSCULAR; INTRAVENOUS at 07:01

## 2022-08-05 RX ADMIN — HYDROMORPHONE HYDROCHLORIDE 0.5 MG: 2 INJECTION, SOLUTION INTRAMUSCULAR; INTRAVENOUS; SUBCUTANEOUS at 09:11

## 2022-08-05 RX ADMIN — DEXMEDETOMIDINE HYDROCHLORIDE 6 MCG: 100 INJECTION, SOLUTION, CONCENTRATE INTRAVENOUS at 08:25

## 2022-08-05 RX ADMIN — HYDROMORPHONE HYDROCHLORIDE 0.4 MG: 2 INJECTION, SOLUTION INTRAMUSCULAR; INTRAVENOUS; SUBCUTANEOUS at 09:31

## 2022-08-05 RX ADMIN — DROPERIDOL 0.62 MG: 2.5 INJECTION, SOLUTION INTRAMUSCULAR; INTRAVENOUS at 11:13

## 2022-08-05 RX ADMIN — ROPIVACAINE HYDROCHLORIDE 20 ML: 5 INJECTION, SOLUTION EPIDURAL; INFILTRATION; PERINEURAL at 07:07

## 2022-08-05 RX ADMIN — PROPOFOL 200 MG: 10 INJECTION, EMULSION INTRAVENOUS at 07:32

## 2022-08-05 RX ADMIN — ONDANSETRON HYDROCHLORIDE 4 MG: 2 INJECTION, SOLUTION INTRAMUSCULAR; INTRAVENOUS at 09:18

## 2022-08-05 RX ADMIN — DIATRIZOATE MEGLUMINE AND DIATRIZOATE SODIUM 120 ML: 600; 100 SOLUTION ORAL; RECTAL at 17:00

## 2022-08-05 RX ADMIN — WATER 2 G: 1 INJECTION INTRAMUSCULAR; INTRAVENOUS; SUBCUTANEOUS at 07:28

## 2022-08-05 RX ADMIN — DEXAMETHASONE SODIUM PHOSPHATE 8 MG: 4 INJECTION, SOLUTION INTRAMUSCULAR; INTRAVENOUS at 07:32

## 2022-08-05 RX ADMIN — DEXMEDETOMIDINE HYDROCHLORIDE 2 MCG: 100 INJECTION, SOLUTION, CONCENTRATE INTRAVENOUS at 09:24

## 2022-08-05 RX ADMIN — LIDOCAINE HYDROCHLORIDE 80 MG: 20 INJECTION, SOLUTION EPIDURAL; INFILTRATION; INTRACAUDAL; PERINEURAL at 07:32

## 2022-08-05 RX ADMIN — HYDROMORPHONE HYDROCHLORIDE 0.5 MG: 2 INJECTION, SOLUTION INTRAMUSCULAR; INTRAVENOUS; SUBCUTANEOUS at 07:59

## 2022-08-05 RX ADMIN — SUCCINYLCHOLINE CHLORIDE 60 MG: 20 INJECTION, SOLUTION INTRAMUSCULAR; INTRAVENOUS at 07:32

## 2022-08-05 RX ADMIN — Medication 3 AMPULE: at 06:57

## 2022-08-05 RX ADMIN — DEXMEDETOMIDINE HYDROCHLORIDE 2 MCG: 100 INJECTION, SOLUTION, CONCENTRATE INTRAVENOUS at 09:20

## 2022-08-05 RX ADMIN — SODIUM CHLORIDE, POTASSIUM CHLORIDE, SODIUM LACTATE AND CALCIUM CHLORIDE: 600; 310; 30; 20 INJECTION, SOLUTION INTRAVENOUS at 09:21

## 2022-08-05 RX ADMIN — LIDOCAINE HYDROCHLORIDE 20 MG: 20 INJECTION, SOLUTION EPIDURAL; INFILTRATION; INTRACAUDAL; PERINEURAL at 09:54

## 2022-08-05 NOTE — ANESTHESIA PROCEDURE NOTES
Peripheral Block    Start time: 8/5/2022 7:09 AM  End time: 8/5/2022 7:16 AM  Performed by: Madhavi Llanes MD  Authorized by: Madhavi Llanes MD       Pre-procedure: Indications: at surgeon's request and post-op pain management    Preanesthetic Checklist: patient identified, risks and benefits discussed, site marked, timeout performed, anesthesia consent given and patient being monitored    Timeout Time: 07:09 EDT      Block Type:   Block Type: Adductor canal block  Laterality:  Left  Monitoring:  Standard ASA monitoring, continuous pulse ox, frequent vital sign checks, heart rate, responsive to questions and oxygen  Injection Technique:  Single shot  Procedures: ultrasound guided    Patient Position: supine  Prep: DuraPrep    Location:  Lower thigh  Needle Type:  Stimuplex  Needle Gauge:  22 G  Needle Localization:  Ultrasound guidance  Motor Response comment:   Motor Response: minimal motor response >0.4 mA    Medication Injected:  Bupivacaine (PF) (MARCAINE) 0.5% injection - Peripheral Nerve Block   20 mL - 8/5/2022 7:15:00 AM  Med Admin Time: 8/5/2022 7:21 AM    Assessment:  Number of attempts:  1  Injection Assessment:  Incremental injection every 5 mL, local visualized surrounding nerve on ultrasound, negative aspiration for blood, no intravascular symptoms, no paresthesia and ultrasound image on chart  Patient tolerance:  Patient tolerated the procedure well with no immediate complications  Under ultrasound guidance, a 22 gauge needle was inserted and placed in close proximity to the nerve. Ultrasound was also used to visualize the spread of the anesthetic in close proximity to the nerve being blocked. The nerve appeared anatomicaly normal.  A permanent ultrasound image was saved in the patient's record.

## 2022-08-05 NOTE — OP NOTES
OPERATIVE REPORT     PATIENT:  Salvatore Chaudhry                                        MRN:  [id number]  :  1975  ADMITTING PHYSICIAN:  Misha Agosto MD  ______________________________________________________________________     DATE OF SURGERY:  2022  SURGEON:  Misha Agosto MD        ASSISTANT:       PREOPERATIVE DIAGNOSIS(ES):  Left lateral malleolus fracture  Left posterior malleolus fracture     POSTOPERATIVE DIAGNOSIS(ES):  Left lateral malleolus fracture  Left posterior malleolus fracture  Left syndesmosis disruption  Left deltoid ligament tear    PROCEDURE(S) PERFORMED:  ORIF Left lateral malleolus  ORIF Left syndesmosis  Left deltoid ligament repair     ANESTHESIA:   General.     TOURNIQUET TIME:    Total Tourniquet Time Documented:  Thigh (Left) - 113 minutes  Total: Thigh (Left) - 113 minutes       IMPLANTS:    Implant Name Type Inv. Item Serial No.  Lot No. LRB No. Used Action   SET INTERNAL FIX IMPL TI --  - SNA  SET INTERNAL FIX IMPL TI --  NA [employer]WD [id number] Left 1 Implanted   SCREW BNE L20MM DIA2.7MM NONLOCKING FOR [employer] - SNA  SCREW BNE L20MM DIA2.7MM NONLOCKING FOR [employer]3CON TECHNOLOGY NA PARAGON 28_WD NA Left 1 Implanted   9 Hole Left Plate   NA PARAGON 28 NA Left 1 Implanted   SCREW BNE L12MM DIA3. 5MM R3CON ANTOINETTE PLT [place] PLATING SYS - SNA  SCREW BNE L12MM DIA3. 5MM R3CON ANTOINETTE PLT [place] PLATING SYS NA PARAGON 28_WD NA Left 4 Implanted   SCREW BNE L14MM DIA3.5MM NONLOCKING FOR R3CON PLATING SYS - SNA  SCREW BNE L14MM DIA3.5MM NONLOCKING FOR R3CON PLATING SYS NA PARAGON 28_WD NA Left 2 Implanted   FiberTak DX Suture [place], Knotless With #2 Suture and Attached Needle   NA ARTHREX [id number] Left 1 Implanted          INDICATIONS FOR THE PROCEDURE:  The patient is a 41 y.o. female who sustained an injury to her ankle while trying to open a door. She slipped in her sandals, twisted her ankle and felt a pop in her ankle.  She had difficulty bearing weight following the injury. Subsequent xrays demonstrated a lateral malleolus and posterior malleolus fracture on xray. We discussed the risks and benefits of nonoperative management and close followup versus surgical fixation. After the discussion she elected to proceed     DESCRIPTION OF PROCEDURE:    The correct patient, extremity, and operation were all identified in the preoperative holding area. I marked the operative extremity. A block was administered by the anesthesia team and the patient was taken back to the operating room and placed supine on the operating room table. General anesthesia was then induced. All bony prominences were well-padded. A tourniquet was applied to the operative extremity. The operative extremity was then prepped and draped in the usual sterile fashion. A preoperative time-out was called and again the correct patient operation and extremity were all identified, preoperative antibiotics were given IV within 30 minutes of the incision, all parties were in agreement and we proceeded with the operation. An Esmarch was used to exsanguinate the lower extremity, and the tourniquet was inflated. A longitudinal incision was made along the distal fibula. Sharp dissection was performed through the skin and superficial tissues. Scissor dissection was performed in a layered fashion in an effort to avoid injury to the superficial peroneal nerve. The fracture site was identified and exposed. The syndesmosis and AITFL was also identified, and the AITFL was noted to torn from the fibular attachment. The fibula fracture was mobilized and debrided of hematoma. Reduction clamps were then used to help reduce and temporarily stabilize the fracture. Reduction was confirmed on orthogonal fluoroscopic views. We then inserted a 2.7mm lag screw in a lag by technique fashion. There was good purchase and compression across the fracture.  A paragon anatomic plate was selected and positioned along the lateral border of the fibula and temporarily held in place with two olive wires. We confirmed the position of the plate on orthogonal views and adjusted the position until we were satisfied. The plate was then secured with nonlocking screws proximally and distally. A cotton test was then performed. On direct visualization the syndesmosis was unstable in both the coronal and sagittal planes. Therefore, I elected to proceed with ORIF of the syndesmosis. The syndesmosis was manually held reduced. Under fluoroscopic guidance, a guidewire for the Arthrex tightrope was inserted. We also confirmed it's position on the lateral view. A small incision was made medially over the guidewire and blunt dissection was performed down to the level of the tibia to ensure no soft tissue would be entrapped by the tightrope. The guidewire was overdrilled and the tightrope was inserted. The button was flipped, and this was confirmed on fluoroscopy. The tightrope was then tightened to stabilize the syndesmosis. Following fixation of the syndesmosis a cotton test was repeated and was noted to more stable. An external rotation stress test as well as a valgus stress test was performed and there was valgus talar tilt and slight medial clear space widening noted. Therefore, I elected to proceed with a deltoid ligament repair. A curvilinear incision was made centered over the medial malleolus. Sharp dissection was carried out through the skin and superficial tissues. Scissor dissection was performed in a layered fashion down to the level of the deltoid and medial malleolus. There was a hematoma and tearing of the deltoid ligament noted. The medial malleolus was then drilled for the Arthrex fibertak suture anchor. I confirmed the trajectory on fluoroscopy to ensure we did not penetrate the medial gutter. The arthrex fibertak suture anchor was then placed into the medial malleolus.   The suture was passed through the deltoid ligament distally. The ankle was held in dorsiflexion and inversion and the suture was tensioned to repair the deltoid ligament. Following repair, repeat external rotation stress and valgus stress xrays were obtained, which demonstrated improved stability. The incisions were then copiously irrigated with sterile saline. The deep fascial layers were closed with 2-0 vicryl. The subcuticular layer was closed with 3-0 monocryl, and the skin was closed with 3-0 nylon. A sterile dressing was then applied followed by a short leg splint. The patient was then awakened from general anesthesia and transported to PACU in stable condition. COMPLICATIONS:  None. POSTOPERATIVE PLAN:  The patient will be nonweightbearing on the operative extremity. We will plan for Xarelto for DVT prophylaxis as she is on birth control. I will plan to see the patient for follow up in 10-14 days for repeat evaluation and likely suture removal.  We will obtain nonweightbearing ankle xrays at that visit.

## 2022-08-05 NOTE — ANESTHESIA PREPROCEDURE EVALUATION
Relevant Problems   No relevant active problems       Anesthetic History   No history of anesthetic complications            Review of Systems / Medical History  Patient summary reviewed, nursing notes reviewed and pertinent labs reviewed    Pulmonary  Within defined limits                 Neuro/Psych   Within defined limits           Cardiovascular  Within defined limits                Exercise tolerance: >4 METS     GI/Hepatic/Renal  Within defined limits              Endo/Other  Within defined limits           Other Findings              Physical Exam    Airway  Mallampati: II  TM Distance: 4 - 6 cm  Neck ROM: normal range of motion   Mouth opening: Normal     Cardiovascular  Regular rate and rhythm,  S1 and S2 normal,  no murmur, click, rub, or gallop  Rhythm: regular  Rate: normal         Dental  No notable dental hx       Pulmonary  Breath sounds clear to auscultation               Abdominal  GI exam deferred       Other Findings            Anesthetic Plan    ASA: 2  Anesthesia type: general and regional - popliteal fossa block    Monitoring Plan: BIS      Induction: Intravenous  Anesthetic plan and risks discussed with: Patient

## 2022-08-05 NOTE — BRIEF OP NOTE
Brief Postoperative Note    Patient: Raina Tony  YOB: 1975  MRN: [id number]    Date of Procedure: 8/5/2022     Pre-Op Diagnosis:   LEFT LATERAL MALLEOLUS FRACTURE  LEFT POSTERIOR MALLEOLUS FRACTURE    Post-Op Diagnosis:   LEFT LATERAL MALLEOLUS FRACTURE     LEFT POSTERIOR MALLEOLUS FRACTURE  LEFT SYNDESMOSIS DISRUPTION  LEFT DELTOID LIGAMENT TEAR    Procedure(s):  ORIF LEFT LATERAL MALLEOLUS   ORIF LEFT SYNDESMOSIS  LEFT DELTOID LIGAMENT REPAIR    Surgeon(s):  Lora Benoit MD    Surgical Assistant: Surg Asst-1: Moe Latif RN    Anesthesia: General     Estimated Blood Loss (mL): Minimal    Complications: None    Specimens: * No specimens in log *     Implants:   Implant Name Type Inv. Item Serial No.  Lot No. LRB No. Used Action   SET INTERNAL FIX IMPL TI --  - SNA  SET INTERNAL FIX IMPL TI --  NA [employer]WD [id number] Left 1 Implanted   SCREW BNE L20MM DIA2.7MM NONLOCKING FOR [employer] TECHN[employer]Y - SNA  SCREW BNE L20MM DIA2.7MM NONLOCKING FOR R3CON TECHNOLOGY NA PARAGON 28_WD NA Left 1 Implanted   9 Hole Left Plate   NA PARAGON 28 NA Left 1 Implanted   SCREW BNE L12MM DIA3. 5MM R3CON ANTOINETTE PLT [place] PLATING SYS - SNA  SCREW BNE L12MM DIA3. 5MM R3CON ANTOINETTE PLT [place] PLATING SYS NA PARAGON 28_WD NA Left 4 Implanted   SCREW BNE L14MM DIA3.5MM NONLOCKING FOR R3CON PLATING SYS - SNA  SCREW BNE L14MM DIA3.5MM NONLOCKING FOR R3CON PLATING SYS NA PARAGON 28_WD NA Left 2 Implanted   FiberTak DX Suture [place], Knotless With #2 Suture and Attached Needle   NA [employer]X [id number] Left 1 Implanted       Drains: * No LDAs found *    Findings: Unstable syndesmosis, deltoid ligament tear    Electronically Signed by Lora Bassett MD on 8/5/2022 at 10:11 AM

## 2022-08-05 NOTE — ANESTHESIA POSTPROCEDURE EVALUATION
Procedure(s):  LEFT LATERAL MALLEOLUS OPEN REDUCTION INTERNAL FIXATION AND SYNDESMOSIS;  DELTOID LIGAMENT REPAIR. general, regional    Anesthesia Post Evaluation      Multimodal analgesia: multimodal analgesia used between 6 hours prior to anesthesia start to PACU discharge  Patient location during evaluation: PACU  Patient participation: complete - patient participated  Level of consciousness: sleepy but conscious and responsive to verbal stimuli  Pain score: 2  Pain management: adequate  Airway patency: patent  Anesthetic complications: no  Cardiovascular status: acceptable  Respiratory status: acceptable  Hydration status: acceptable  Comments: +Post-Anesthesia Evaluation and Assessment    Patient: Esther Dobson MRN: [id number]  SSN: [id number]   YOB: 1975  Age: 41 y.o. Sex: female      Cardiovascular Function/Vital Signs    /65   Pulse 95   Temp 36.9 °C (98.4 °F)   Resp 12   Ht 5' 7\" (1.702 m)   Wt 79.5 kg (175 lb 4.3 oz)   SpO2 98%   BMI 27.45 kg/m²     Patient is status post Procedure(s) with comments:  LEFT LATERAL MALLEOLUS OPEN REDUCTION INTERNAL FIXATION AND SYNDESMOSIS;  DELTOID LIGAMENT REPAIR - (POP. AND ADDUCTOR BLOCK). Nausea/Vomiting: Controlled. Postoperative hydration reviewed and adequate. Pain:  Pain Scale 1: Numeric (0 - 10) (08/05/22 1100)  Pain Intensity 1: 0 (08/05/22 1100)   Managed. Neurological Status:   Neuro (WDL): Within Defined Limits (08/05/22 1020)   At baseline. Mental Status and Level of Consciousness: Arousable. Pulmonary Status:   O2 Device: Nasal cannula (08/05/22 1020)   Adequate oxygenation and airway patent. Complications related to anesthesia: None    Post-anesthesia assessment completed. No concerns.     Signed By: Alan Byrnes MD    8/5/2022  Post anesthesia nausea and vomiting:  controlled  Final Post Anesthesia Temperature Assessment:  Normothermia (36.0-37.5 degrees C)      INITIAL Post-op Vital signs:   Vitals Value Taken Time   /65 08/05/22 1100   Temp 36.9 °C (98.4 °F) 08/05/22 1005   Pulse 100 08/05/22 1107   Resp 17 08/05/22 1107   SpO2 99 % 08/05/22 1107   Vitals shown include unvalidated device data.

## 2022-08-05 NOTE — DISCHARGE INSTRUCTIONS
Weightbearing:  Nonweightbearing on your operative extremity. You may use crutches, a rolling knee scooter, or walker to help with ambulation. Dressings:    Leave your dressing/splint in place until your post-operative visit. Keep it clean and dry. Mild to moderate blood or drainage after surgery is expected. Blood Clot Medication:    You have been prescribed Xarelto to help reduce your risk of developing blood clots after surgery. You should start this the morning following your surgery. Pain Control: For pain control, you have been prescribed a narcotic pain medication. You may also take tylenol and NSAIDs such as ibuprofen for pain control, do this in limited doses since taking blood thinner xarelto. You should wean from the narcotic medication as you are able. Do not drink alcohol or drive while using narcotic pain medication. You should also keep your foot elevated as much as possible. Avoid pressure under the heel by placing pillows under the calf, not your foot. Constipation:    While taking narcotics, you may have constipation. A stool softener is recommended. You may use over-the-counter stool softeners such as Miralax, Colace, and Senna. Nausea and Vomiting:    Sometimes after surgery, patients have nausea or vomiting. A medicine (Zofran)4 mg has been prescribed for this. If you do not have nausea or vomiting, then you do you not need to take this. General Considerations:  1. Keep your foot elevated as much as possible after surgery. It is ideal to have it positioned above your heart to allow swelling to subside. You can place it on several pillows or on the back of the couch. While eating, rest it on another chair at the dinner table. The worst swelling occurs the frst week or two after surgery, but can take much longer up to months to fully subside. 2. Placing ice packs on the outside of the bandage may help with pain and swelling.   We recommend 20-30 minutes on and then 90 minutes off. Also, do not place ice packs directly on the skin or on the toes themselves (which can impair circulation to the toes). 3. Keep your bandage or dressing dry. If it becomes wet, please contact our office. It will likely be necessary to have you come in to be evaluated and have it changed to a dry one. A wet, saturated dressing can lead to problems with stitches and wound healing. 4. Bathing or showering can be challenging. It is important to keep the dressing or cast dry, so covering it with a cast cover or garbage bag can help. It is important to avoid submerging the leg in case the cover leaks. Sitting in the tub or on a shower chair is also recommended, as it is not safe to balance or stand on one leg.  5. Once your sutures have been removed, it is helpful to massage your scar(s) two to three times daily. This can help moisturize the incision, decrease sensitivity, and break up scar tissue. We recommend you use common over-the-counter products such as cocoa butter, vitamin E oil, or Mederma. Rub along the scar as well across the scar side to side. 6. Do not drive until instructed by your surgeon. You should not drive while taking narcotic pain medications. Even if surgery was performed on your left foot, driving for long periods can lead to swelling and discomfort due to the foot being down. Once you are allowed to drive, try shorter trips to get accustomed to operating a car again. It may help to practice in an empty parking lot to get used to controlling the pedals. TO PREVENT AN INFECTION      [address] YOUR HANDS    To prevent infection, good handwashing is the most important thing you or your caregiver can do. Wash your hands with soap and water or use the hand  we gave you before you touch any wounds. SHOWER    Use the antibacterial soap we gave you when you take a shower. Shower with this soap until your wounds are healed.       To reach all areas of your body, you may need someone to help you. Dont forget to clean your belly button with every shower. USE CLEAN SHEETS    Use freshly cleaned sheets on your bed after surgery. To keep the surgery site clean, do not allow pets to sleep with you while your wound is still healing. TAKE TYLENOL/ACETAMINOPHEN 1000 mg every 6 hours first few days then reduce to 500 mg as pain begins to subside. Also can take tramadol and start both tramadol and tylenol tonight, generally spaced out by several hours to maximize pain control. TAKE NARCOTIC PAIN MEDICATIONS WITH FOOD! For the night of surgery, while block is still in effect, start with 1 pain pill at bedtime    Narcotics tend to be constipating and we recommend taking a stool softener such as Colace or Miralax (follow package instructions). If you were given prescriptions, please review the written information on the prescribed medications. DO NOT DRIVE WHILE TAKING NARCOTIC PAIN MEDICATIONS. CPAP PATIENTS BE SURE TO WEAR MACHINE WHENEVER NAPPING OR SLEEPING DAY/NIGHT OF SURGERY! DISCHARGE SUMMARY from Nurse    The following personal items collected during your admission are returned to you:   Dental Appliance: Dental Appliances: None  Vision: Visual Aid: None  Hearing Aid:    Jewelry: Jewelry: None  Clothing: Clothing: Undergarments, Socks, Shirt, Pants, Footwear (TO PACU)  Other Valuables: Other Valuables: Crutches, Cell Phone (TO PACU)  Valuables sent to safe:        PATIENT INSTRUCTIONS:    After General Anesthesia or Intravenous Sedation, for 24 hours or while taking prescription Narcotics:  Someone should be with you for the next 24 hours. For your own safety, a responsible adult must drive you home. Limit your activities  Recommended activity: Rest today, up with assistance today. Do not climb stairs or shower unattended for the next 24 hours.   Start with a soft bland diet and advance as tolerated (no nausea) to regular diet. If you have a sore throat some things that may help are: fluids, warm salt water gargle, or throat lozenges. If this does not improve after several days please follow up with your family physician. Do not drive and operate hazardous machinery  Do not make important personal or business decisions  Do  not drink alcoholic beverages  If you have not urinated within 8 hours after discharge, please contact your surgeon on call. Report the following to your surgeon:  Excessive pain, swelling, redness or odor of or around the surgical area  Temperature over 100.5  Nausea and vomiting lasting longer than 4 hours or if unable to take medications  Any signs of decreased circulation or nerve impairment to extremity: change in color, persistent  numbness, tingling, coldness or increase pain      If you received a lower extremity nerve block, please use your crutches, walker, or cane until the nerve block has worn off, then refer to your surgeons post-operative instructions. You will receive a Post Operative Call from one of the Recovery Room Nurses on the day after your surgery to check on you. It is very important for us to know how you are recovering after your surgery. If you have an issue please call your surgeon, do not wait for the post operative call. You may receive an e-mail or letter in the mail from Stephanie Prieto regarding your experience with us in the Ambulatory Surgery Unit. Your feedback is valuable to us and we appreciate your participation in the survey. If the above instructions are not adequate or you are having problems after your surgery, call your physician at their office number. We wish youre a speedy recovery ? What to do at Home:    *  Please give a list of your current medications to your Primary Care Provider.     *  Please update this list whenever your medications are discontinued, doses are      changed, or new medications (including over-the-counter products) are added. *  Please carry medication information at all times in case of emergency situations. If you have not had your influenza or pneumococcal vaccines, please follow up with your primary care physician. The discharge information has been reviewed with the patient and caregiver. The patient and caregiver verbalized understanding. Steve Wooten THROMBOSIS AND PULMONARY EMBOLUS    SURGICAL PATIENTS  Surgical patients are the #1 risk for DVT and PE. WHAT IS DVT? WHAT IS PE?  DVT is a serious condition where blood clots develop deep in the veins of the legs. PE occurs when a blood clot breaks loose from the wall of a vein and travels to the lungs blocking the pulmonary artery or one of its branches impairing blood flow from the heart, which could result in death.   RISK FACTORS  Surgery lasting longer than 45 minutes  History of inflammatory bowel disease  Oral contraceptive or hormone replacement therapy  Immobilization  Varicose veins / swollen legs  Smoking   CHF / Acute MI / Irregular heart beat  Family history of thrombosis  General anesthesia greater than 2 hours  Obesity  Infection of less than one month  Less than 1 month postpartum  COPD / Pneumonia  Arthroscopic surgery  Malignancy / cancer  Spine surgery  Blood abnormalities  Stroke / Paralysis / Coma    SIGNS AND SYMPTOMS OF DEEP VEIN TROMBOSIS   -  ER VISIT  Usually occurs in one leg, above or below the knee  Swelling - one calf or thigh may be larger than the other  Feeling increased warmth in the area of the leg that is swollen or painful  Leg pain, which may increase when standing or walking  Swelling along the vein of the leg  When swollen areas is pressed with a finger, a depression may remain  Tenderness of the leg that may be confined to one area  Change in leg color (bluish or red)    SIGNS AND SYMPTOMS OF PULMONARY EMBOLUS  - 911 CALL  Chest pain that gets worse with deep breath, coughing or chest movement  Coughing up blood  Sweating  Shortness of breath or difficulty breathing  Rapid heart beat  Lightheadedness    HOW TO REDUCE THE POSSIBLE RISK OF DVT  Exercise - simple activities as rotating ankles and wrists, wiggling toes and fingers, tightening and relaxing muscles in calves and thighs promotes circulation while recovering from surgery. Please do these exercises every hour during waking hours-ankle pumps R foot, leg lifts- both legs and knee bends - both legs :10 to 20  every 1 to 2 hours while awake. Take mediation as prescribed by your physician (Lovenox, Coumadin, Aspirin)  Resume your normal activities as soon as your doctor advises you to do so. Remember, when traveling, to [place] your legs frequently.       PATIENTS WHO BELIEVE THEY MAY BE EXPERIENCING SIGNS AND SYMPTOMS OF DVT OR PE SHOULD SEEK MEDICAL HELP IMMEDIATELY

## 2022-08-05 NOTE — PERIOP NOTES
3842 - PT DENIES FEVER, COLD, COUGH, SOB, N/V, DIARRHEA. ..... PRE-OP TCHING DONE - PT VERBALIZES UNDERSTANDING. STRETCHER IN LOWEST POSITION, CB IN PLACE, SR UP X2.    [id number] - TIMEOUT DONE - DR. MON AT BEDSIDE TO PLACE LEFT LEG BLOCK. PT GERSON WELL.  VSS.

## 2022-08-05 NOTE — ANESTHESIA PROCEDURE NOTES
Peripheral Block    Start time: 8/5/2022 7:01 AM  End time: 8/5/2022 7:08 AM  Performed by: Valentino Dukes, MD  Authorized by: Valentino Dukes, MD       Pre-procedure: Indications: at surgeon's request and post-op pain management    Preanesthetic Checklist: patient identified, risks and benefits discussed, site marked, timeout performed, anesthesia consent given and patient being monitored    Timeout Time: 07:01 EDT      Block Type:   Block Type:  Saphenous and popliteal  Laterality:  Left  Monitoring:  Standard ASA monitoring, continuous pulse ox, frequent vital sign checks, heart rate, responsive to questions and oxygen  Injection Technique:  Single shot  Procedures: ultrasound guided and nerve stimulator    Patient Position: right lateral decubitus  Prep: DuraPrep    Location:  Mid thigh (10 cm above the popliteal fossa & tibial tuberosity)  Needle Type:  Stimuplex  Needle Gauge:  22 G  Needle Localization:  Ultrasound guidance and nerve stimulator  Motor Response comment:   Motor Response: minimal motor response >0.4 mA    Medication Injected:  Midazolam (VERSED) injection - IntraVENous   2 mg - 8/5/2022 7:01:00 AM  ropivacaine (PF) (NAROPIN)(0.5%) 5 mg/mL injection - Peripheral Nerve Block   20 mL - 8/5/2022 7:07:00 AM  Med Admin Time: 8/5/2022 7:20 AM    Assessment:  Number of attempts:  1  Injection Assessment:  Incremental injection every 5 mL, local visualized surrounding nerve on ultrasound, negative aspiration for blood, no intravascular symptoms, no paresthesia and ultrasound image on chart  Patient tolerance:  Patient tolerated the procedure well with no immediate complications  Under ultrasound guidance, a 22 gauge needle was inserted and placed in close proximity to the nerve. Ultrasound was also used to visualize the spread of the anesthetic in close proximity to the nerve being blocked.   The nerve appeared anatomicaly normal.  A permanent ultrasound image was saved in the patient's record.

## 2022-08-05 NOTE — PERIOP NOTES
Mario Alberto Yip  1975  [id number]    Situation:  Verbal report given from: JENNIE Duran RN  Procedure: Procedure(s) with comments:  LEFT LATERAL MALLEOLUS OPEN REDUCTION INTERNAL FIXATION AND SYNDESMOSIS;  DELTOID LIGAMENT REPAIR - (POP. AND ADDUCTOR BLOCK)    Background:    Preoperative diagnosis: LEFT LATERAL MALLEOLUS FRACTURE    Postoperative diagnosis: LEFT LATERAL MALLEOLUS FRACTURE    :  Dr. Emerita Coe    Assistant(s): Circ-1: Morgan Brooke RN  Circ-Intern: Sangeeta Cutler RN  Scrub Tech-1: [facility]  Surg Asst-1: Otilia Brower RN  Float Staff: Abdirashid Cardenas    Specimens: * No specimens in log *    Assessment:  Intra-procedure medications         Anesthesia gave intra-procedure sedation and medications, see anesthesia flow sheet     Intravenous fluids: LR@ KVO     Vital signs stable       Recommendation:

## 2022-08-05 NOTE — PERIOP NOTES
Pt complaining of feeling sweaty and hot all over, pt is perspiring on face, ice pack applied to neck, pt denies any pain or nausea at this time. 1030-Pt now  having some complaints of nausea, able to keep some ice chips down and hot flash, sweating improving with cold washcloths and ice pack. 1045-Pt medicated with zofran 4mg iv for continued complaints of nausea. Also elequil patch applied to pt's pillow to help alleviate  nausea.  at bedside discussing surgical results with pt    1100-Called Dr. Maria C Walton about pt's continued nausea, ordered droperidol 0.625 mg iv and iv fluids continuing to infuse. 1115-Pt's  updated on pt's status. VSS. Educated on not mixing advil with xarelto unless absolutely necessary and then in limited doses for pain control. Also educated pt's  on leg exercises. 1128-Pt tolerating ice chips and liquids. 1144-Transported pt to phase 2 care for d/c home.

## (undated) DEVICE — BANDAGE COMPR M W6INXL10YD WHT BGE VELC E MTRX HK AND LOOP

## (undated) DEVICE — BANDAGE,ELASTIC,ESMARK,STERILE,4"X9',LF: Brand: MEDLINE

## (undated) DEVICE — GLOVE SURG SZ 7 L12IN FNGR THK79MIL GRN LTX FREE

## (undated) DEVICE — EXTREMITY-MRMC: Brand: MEDLINE INDUSTRIES, INC.

## (undated) DEVICE — PAD ABSRB W8XL10IN ABD HYDROPHOBIC NONWOVEN THCK LAYR CELOS

## (undated) DEVICE — SMOKE EVACUATION PENCIL: Brand: VALLEYLAB

## (undated) DEVICE — SUTURE MCRYL SZ 3-0 L27IN ABSRB UD L19MM PS-2 3/8 CIR PRIM Y427H

## (undated) DEVICE — BANDAGE COBAN 4 IN COMPR W4INXL5YD FOAM COHESIVE QUIK STK SELF ADH SFT

## (undated) DEVICE — KIT INSTR DRL GUID 1.6/1.35MM GUIDWIRE DISP FIBERTAK DX

## (undated) DEVICE — ZIMMER® STERILE DISPOSABLE TOURNIQUET CUFF WITH PLC, DUAL PORT, SINGLE BLADDER, 34 IN. (86 CM)

## (undated) DEVICE — SUT ETHLN 3-0 18IN PS2 BLK --

## (undated) DEVICE — C-ARMOR C-ARM EQUIPMENT COVERS CLEAR STERILE UNIVERSAL FIT 12 PER CASE: Brand: C-ARMOR

## (undated) DEVICE — SUTURE VCRL SZ 2-0 L27IN ABSRB UD L26MM SH 1/2 CIR J417H

## (undated) DEVICE — C-ARM: Brand: UNBRANDED

## (undated) DEVICE — SOLUTION IRRIG 1000ML 0.9% SOD CHL USP POUR PLAS BTL

## (undated) DEVICE — DRAPE,REIN 53X77,STERILE: Brand: MEDLINE

## (undated) DEVICE — 4-PORT MANIFOLD: Brand: NEPTUNE 2

## (undated) DEVICE — TRAP FLUID BUFFALO FLTR

## (undated) DEVICE — INTENDED FOR TISSUE SEPARATION, AND OTHER PROCEDURES THAT REQUIRE A SHARP SURGICAL BLADE TO PUNCTURE OR CUT.: Brand: BARD-PARKER ® CARBON RIB-BACK BLADES

## (undated) DEVICE — DRESSING,GAUZE,XEROFORM,CURAD,5"X9",ST: Brand: CURAD

## (undated) DEVICE — GLOVE SURG SZ 65 THK91MIL LTX FREE SYN POLYISOPRENE

## (undated) DEVICE — PREP SKN CHLRAPRP APL 26ML STR --

## (undated) DEVICE — PADDING CAST SPEC 6INX4YD COT --